# Patient Record
Sex: MALE | Race: AMERICAN INDIAN OR ALASKA NATIVE | NOT HISPANIC OR LATINO | URBAN - METROPOLITAN AREA
[De-identification: names, ages, dates, MRNs, and addresses within clinical notes are randomized per-mention and may not be internally consistent; named-entity substitution may affect disease eponyms.]

---

## 2019-08-13 ENCOUNTER — INPATIENT (INPATIENT)
Facility: HOSPITAL | Age: 54
LOS: 5 days | Discharge: HOME | End: 2019-08-19
Attending: INTERNAL MEDICINE | Admitting: INTERNAL MEDICINE
Payer: MEDICAID

## 2019-08-13 VITALS
DIASTOLIC BLOOD PRESSURE: 61 MMHG | TEMPERATURE: 98 F | RESPIRATION RATE: 16 BRPM | HEART RATE: 70 BPM | SYSTOLIC BLOOD PRESSURE: 133 MMHG | OXYGEN SATURATION: 100 %

## 2019-08-13 NOTE — ED ADULT TRIAGE NOTE - CHIEF COMPLAINT QUOTE
Patient c/o palpitations x3 days worsening today. Patient denies chest pain, c/o non-radiating abdominal pain and nausea, denies any vomiting. Denies any diarrhea. Hx. HTN, HLD, "thyroid problem"

## 2019-08-14 ENCOUNTER — TRANSCRIPTION ENCOUNTER (OUTPATIENT)
Age: 54
End: 2019-08-14

## 2019-08-14 DIAGNOSIS — I10 ESSENTIAL (PRIMARY) HYPERTENSION: ICD-10-CM

## 2019-08-14 DIAGNOSIS — R10.9 UNSPECIFIED ABDOMINAL PAIN: ICD-10-CM

## 2019-08-14 DIAGNOSIS — E78.5 HYPERLIPIDEMIA, UNSPECIFIED: ICD-10-CM

## 2019-08-14 DIAGNOSIS — E87.1 HYPO-OSMOLALITY AND HYPONATREMIA: ICD-10-CM

## 2019-08-14 DIAGNOSIS — N40.0 BENIGN PROSTATIC HYPERPLASIA WITHOUT LOWER URINARY TRACT SYMPTOMS: ICD-10-CM

## 2019-08-14 DIAGNOSIS — E87.6 HYPOKALEMIA: ICD-10-CM

## 2019-08-14 DIAGNOSIS — Z29.9 ENCOUNTER FOR PROPHYLACTIC MEASURES, UNSPECIFIED: ICD-10-CM

## 2019-08-14 DIAGNOSIS — E03.9 HYPOTHYROIDISM, UNSPECIFIED: ICD-10-CM

## 2019-08-14 DIAGNOSIS — E11.9 TYPE 2 DIABETES MELLITUS WITHOUT COMPLICATIONS: ICD-10-CM

## 2019-08-14 LAB
ALBUMIN SERPL ELPH-MCNC: 4.3 G/DL — SIGNIFICANT CHANGE UP (ref 3.3–5)
ALP SERPL-CCNC: 101 U/L — SIGNIFICANT CHANGE UP (ref 40–120)
ALT FLD-CCNC: 15 U/L — SIGNIFICANT CHANGE UP (ref 4–41)
ANION GAP SERPL CALC-SCNC: 11 MMO/L — SIGNIFICANT CHANGE UP (ref 7–14)
ANION GAP SERPL CALC-SCNC: 11 MMO/L — SIGNIFICANT CHANGE UP (ref 7–14)
ANION GAP SERPL CALC-SCNC: 12 MMO/L — SIGNIFICANT CHANGE UP (ref 7–14)
ANION GAP SERPL CALC-SCNC: 15 MMO/L — HIGH (ref 7–14)
AST SERPL-CCNC: 21 U/L — SIGNIFICANT CHANGE UP (ref 4–40)
BASE EXCESS BLDV CALC-SCNC: 5.5 MMOL/L — SIGNIFICANT CHANGE UP
BASOPHILS # BLD AUTO: 0.03 K/UL — SIGNIFICANT CHANGE UP (ref 0–0.2)
BASOPHILS NFR BLD AUTO: 0.4 % — SIGNIFICANT CHANGE UP (ref 0–2)
BILIRUB SERPL-MCNC: 0.8 MG/DL — SIGNIFICANT CHANGE UP (ref 0.2–1.2)
BLOOD GAS VENOUS - CREATININE: 1.09 MG/DL — SIGNIFICANT CHANGE UP (ref 0.5–1.3)
BLOOD GAS VENOUS - FIO2: 21 — SIGNIFICANT CHANGE UP
BUN SERPL-MCNC: 12 MG/DL — SIGNIFICANT CHANGE UP (ref 7–23)
BUN SERPL-MCNC: 8 MG/DL — SIGNIFICANT CHANGE UP (ref 7–23)
CALCIUM SERPL-MCNC: 8.8 MG/DL — SIGNIFICANT CHANGE UP (ref 8.4–10.5)
CALCIUM SERPL-MCNC: 9 MG/DL — SIGNIFICANT CHANGE UP (ref 8.4–10.5)
CALCIUM SERPL-MCNC: 9.2 MG/DL — SIGNIFICANT CHANGE UP (ref 8.4–10.5)
CALCIUM SERPL-MCNC: 9.4 MG/DL — SIGNIFICANT CHANGE UP (ref 8.4–10.5)
CHLORIDE BLDV-SCNC: 84 MMOL/L — LOW (ref 96–108)
CHLORIDE SERPL-SCNC: 81 MMOL/L — LOW (ref 98–107)
CHLORIDE SERPL-SCNC: 85 MMOL/L — LOW (ref 98–107)
CHLORIDE SERPL-SCNC: 88 MMOL/L — LOW (ref 98–107)
CHLORIDE SERPL-SCNC: 94 MMOL/L — LOW (ref 98–107)
CO2 SERPL-SCNC: 16 MMOL/L — LOW (ref 22–31)
CO2 SERPL-SCNC: 27 MMOL/L — SIGNIFICANT CHANGE UP (ref 22–31)
CREAT SERPL-MCNC: 0.92 MG/DL — SIGNIFICANT CHANGE UP (ref 0.5–1.3)
CREAT SERPL-MCNC: 1.02 MG/DL — SIGNIFICANT CHANGE UP (ref 0.5–1.3)
CREAT SERPL-MCNC: 1.02 MG/DL — SIGNIFICANT CHANGE UP (ref 0.5–1.3)
CREAT SERPL-MCNC: 1.06 MG/DL — SIGNIFICANT CHANGE UP (ref 0.5–1.3)
EOSINOPHIL # BLD AUTO: 0.24 K/UL — SIGNIFICANT CHANGE UP (ref 0–0.5)
EOSINOPHIL NFR BLD AUTO: 3.4 % — SIGNIFICANT CHANGE UP (ref 0–6)
GAS PNL BLDV: 114 MMOL/L — CRITICAL LOW (ref 136–146)
GLUCOSE BLDV-MCNC: 99 MG/DL — SIGNIFICANT CHANGE UP (ref 70–99)
GLUCOSE SERPL-MCNC: 121 MG/DL — HIGH (ref 70–99)
GLUCOSE SERPL-MCNC: 133 MG/DL — HIGH (ref 70–99)
GLUCOSE SERPL-MCNC: 93 MG/DL — SIGNIFICANT CHANGE UP (ref 70–99)
GLUCOSE SERPL-MCNC: 96 MG/DL — SIGNIFICANT CHANGE UP (ref 70–99)
HCO3 BLDV-SCNC: 27 MMOL/L — SIGNIFICANT CHANGE UP (ref 20–27)
HCT VFR BLD CALC: 36.5 % — LOW (ref 39–50)
HCT VFR BLD CALC: 37.2 % — LOW (ref 39–50)
HCT VFR BLDV CALC: 40.7 % — SIGNIFICANT CHANGE UP (ref 39–51)
HGB BLD-MCNC: 12.6 G/DL — LOW (ref 13–17)
HGB BLD-MCNC: 12.7 G/DL — LOW (ref 13–17)
HGB BLDV-MCNC: 13.2 G/DL — SIGNIFICANT CHANGE UP (ref 13–17)
IMM GRANULOCYTES NFR BLD AUTO: 0.3 % — SIGNIFICANT CHANGE UP (ref 0–1.5)
LACTATE BLDV-MCNC: 1.2 MMOL/L — SIGNIFICANT CHANGE UP (ref 0.5–2)
LYMPHOCYTES # BLD AUTO: 1.83 K/UL — SIGNIFICANT CHANGE UP (ref 1–3.3)
LYMPHOCYTES # BLD AUTO: 25.6 % — SIGNIFICANT CHANGE UP (ref 13–44)
MAGNESIUM SERPL-MCNC: 2.1 MG/DL — SIGNIFICANT CHANGE UP (ref 1.6–2.6)
MAGNESIUM SERPL-MCNC: 2.2 MG/DL — SIGNIFICANT CHANGE UP (ref 1.6–2.6)
MAGNESIUM SERPL-MCNC: 2.2 MG/DL — SIGNIFICANT CHANGE UP (ref 1.6–2.6)
MCHC RBC-ENTMCNC: 28.9 PG — SIGNIFICANT CHANGE UP (ref 27–34)
MCHC RBC-ENTMCNC: 29 PG — SIGNIFICANT CHANGE UP (ref 27–34)
MCHC RBC-ENTMCNC: 34.1 % — SIGNIFICANT CHANGE UP (ref 32–36)
MCHC RBC-ENTMCNC: 34.5 % — SIGNIFICANT CHANGE UP (ref 32–36)
MCV RBC AUTO: 83.9 FL — SIGNIFICANT CHANGE UP (ref 80–100)
MCV RBC AUTO: 84.5 FL — SIGNIFICANT CHANGE UP (ref 80–100)
MONOCYTES # BLD AUTO: 0.7 K/UL — SIGNIFICANT CHANGE UP (ref 0–0.9)
MONOCYTES NFR BLD AUTO: 9.8 % — SIGNIFICANT CHANGE UP (ref 2–14)
NEUTROPHILS # BLD AUTO: 4.32 K/UL — SIGNIFICANT CHANGE UP (ref 1.8–7.4)
NEUTROPHILS NFR BLD AUTO: 60.5 % — SIGNIFICANT CHANGE UP (ref 43–77)
NRBC # FLD: 0 K/UL — SIGNIFICANT CHANGE UP (ref 0–0)
NRBC # FLD: 0 K/UL — SIGNIFICANT CHANGE UP (ref 0–0)
OSMOLALITY SERPL: 270 MOSMO/KG — LOW (ref 275–295)
OSMOLALITY UR: 97 MOSMO/KG — SIGNIFICANT CHANGE UP (ref 50–1200)
PCO2 BLDV: 49 MMHG — SIGNIFICANT CHANGE UP (ref 41–51)
PH BLDV: 7.4 PH — SIGNIFICANT CHANGE UP (ref 7.32–7.43)
PHOSPHATE SERPL-MCNC: 2.9 MG/DL — SIGNIFICANT CHANGE UP (ref 2.5–4.5)
PHOSPHATE SERPL-MCNC: 3 MG/DL — SIGNIFICANT CHANGE UP (ref 2.5–4.5)
PHOSPHATE SERPL-MCNC: 3.1 MG/DL — SIGNIFICANT CHANGE UP (ref 2.5–4.5)
PLATELET # BLD AUTO: 269 K/UL — SIGNIFICANT CHANGE UP (ref 150–400)
PLATELET # BLD AUTO: 274 K/UL — SIGNIFICANT CHANGE UP (ref 150–400)
PMV BLD: 9.4 FL — SIGNIFICANT CHANGE UP (ref 7–13)
PMV BLD: 9.9 FL — SIGNIFICANT CHANGE UP (ref 7–13)
PO2 BLDV: 24 MMHG — LOW (ref 35–40)
POTASSIUM BLDV-SCNC: 3.1 MMOL/L — LOW (ref 3.4–4.5)
POTASSIUM SERPL-MCNC: 3.3 MMOL/L — LOW (ref 3.5–5.3)
POTASSIUM SERPL-MCNC: 3.9 MMOL/L — SIGNIFICANT CHANGE UP (ref 3.5–5.3)
POTASSIUM SERPL-MCNC: 3.9 MMOL/L — SIGNIFICANT CHANGE UP (ref 3.5–5.3)
POTASSIUM SERPL-MCNC: 5 MMOL/L — SIGNIFICANT CHANGE UP (ref 3.5–5.3)
POTASSIUM SERPL-SCNC: 3.3 MMOL/L — LOW (ref 3.5–5.3)
POTASSIUM SERPL-SCNC: 3.9 MMOL/L — SIGNIFICANT CHANGE UP (ref 3.5–5.3)
POTASSIUM SERPL-SCNC: 3.9 MMOL/L — SIGNIFICANT CHANGE UP (ref 3.5–5.3)
POTASSIUM SERPL-SCNC: 5 MMOL/L — SIGNIFICANT CHANGE UP (ref 3.5–5.3)
PROT SERPL-MCNC: 7.3 G/DL — SIGNIFICANT CHANGE UP (ref 6–8.3)
RBC # BLD: 4.35 M/UL — SIGNIFICANT CHANGE UP (ref 4.2–5.8)
RBC # BLD: 4.4 M/UL — SIGNIFICANT CHANGE UP (ref 4.2–5.8)
RBC # FLD: 12.6 % — SIGNIFICANT CHANGE UP (ref 10.3–14.5)
RBC # FLD: 12.7 % — SIGNIFICANT CHANGE UP (ref 10.3–14.5)
SAO2 % BLDV: 36.3 % — LOW (ref 60–85)
SODIUM SERPL-SCNC: 120 MMOL/L — CRITICAL LOW (ref 135–145)
SODIUM SERPL-SCNC: 123 MMOL/L — LOW (ref 135–145)
SODIUM SERPL-SCNC: 125 MMOL/L — LOW (ref 135–145)
SODIUM SERPL-SCNC: 126 MMOL/L — LOW (ref 135–145)
SODIUM UR-SCNC: 28 MMOL/L — SIGNIFICANT CHANGE UP
T3 SERPL-MCNC: 86.8 NG/DL — SIGNIFICANT CHANGE UP (ref 80–200)
T4 FREE SERPL-MCNC: 1.31 NG/DL — SIGNIFICANT CHANGE UP (ref 0.9–1.8)
TROPONIN T, HIGH SENSITIVITY: 9 NG/L — SIGNIFICANT CHANGE UP (ref ?–14)
TSH SERPL-MCNC: 5.18 UIU/ML — HIGH (ref 0.27–4.2)
WBC # BLD: 5.38 K/UL — SIGNIFICANT CHANGE UP (ref 3.8–10.5)
WBC # BLD: 7.14 K/UL — SIGNIFICANT CHANGE UP (ref 3.8–10.5)
WBC # FLD AUTO: 5.38 K/UL — SIGNIFICANT CHANGE UP (ref 3.8–10.5)
WBC # FLD AUTO: 7.14 K/UL — SIGNIFICANT CHANGE UP (ref 3.8–10.5)

## 2019-08-14 PROCEDURE — 71046 X-RAY EXAM CHEST 2 VIEWS: CPT | Mod: 26

## 2019-08-14 PROCEDURE — 74177 CT ABD & PELVIS W/CONTRAST: CPT | Mod: 26

## 2019-08-14 PROCEDURE — 99223 1ST HOSP IP/OBS HIGH 75: CPT | Mod: GC

## 2019-08-14 RX ORDER — SODIUM CHLORIDE 9 MG/ML
1000 INJECTION INTRAMUSCULAR; INTRAVENOUS; SUBCUTANEOUS
Refills: 0 | Status: DISCONTINUED | OUTPATIENT
Start: 2019-08-14 | End: 2019-08-14

## 2019-08-14 RX ORDER — AMLODIPINE BESYLATE 2.5 MG/1
5 TABLET ORAL DAILY
Refills: 0 | Status: DISCONTINUED | OUTPATIENT
Start: 2019-08-14 | End: 2019-08-15

## 2019-08-14 RX ORDER — SODIUM CHLORIDE 9 MG/ML
1000 INJECTION, SOLUTION INTRAVENOUS
Refills: 0 | Status: DISCONTINUED | OUTPATIENT
Start: 2019-08-14 | End: 2019-08-15

## 2019-08-14 RX ORDER — PANTOPRAZOLE SODIUM 20 MG/1
40 TABLET, DELAYED RELEASE ORAL
Refills: 0 | Status: DISCONTINUED | OUTPATIENT
Start: 2019-08-14 | End: 2019-08-19

## 2019-08-14 RX ORDER — POTASSIUM CHLORIDE 20 MEQ
40 PACKET (EA) ORAL ONCE
Refills: 0 | Status: COMPLETED | OUTPATIENT
Start: 2019-08-14 | End: 2019-08-14

## 2019-08-14 RX ORDER — DEXTROSE 50 % IN WATER 50 %
15 SYRINGE (ML) INTRAVENOUS ONCE
Refills: 0 | Status: DISCONTINUED | OUTPATIENT
Start: 2019-08-14 | End: 2019-08-15

## 2019-08-14 RX ORDER — INSULIN LISPRO 100/ML
VIAL (ML) SUBCUTANEOUS AT BEDTIME
Refills: 0 | Status: DISCONTINUED | OUTPATIENT
Start: 2019-08-14 | End: 2019-08-15

## 2019-08-14 RX ORDER — POTASSIUM CHLORIDE 20 MEQ
10 PACKET (EA) ORAL ONCE
Refills: 0 | Status: COMPLETED | OUTPATIENT
Start: 2019-08-14 | End: 2019-08-14

## 2019-08-14 RX ORDER — ATORVASTATIN CALCIUM 80 MG/1
20 TABLET, FILM COATED ORAL AT BEDTIME
Refills: 0 | Status: DISCONTINUED | OUTPATIENT
Start: 2019-08-14 | End: 2019-08-19

## 2019-08-14 RX ORDER — DEXTROSE 50 % IN WATER 50 %
25 SYRINGE (ML) INTRAVENOUS ONCE
Refills: 0 | Status: DISCONTINUED | OUTPATIENT
Start: 2019-08-14 | End: 2019-08-15

## 2019-08-14 RX ORDER — LOSARTAN POTASSIUM 100 MG/1
50 TABLET, FILM COATED ORAL DAILY
Refills: 0 | Status: DISCONTINUED | OUTPATIENT
Start: 2019-08-14 | End: 2019-08-15

## 2019-08-14 RX ORDER — GLUCAGON INJECTION, SOLUTION 0.5 MG/.1ML
1 INJECTION, SOLUTION SUBCUTANEOUS ONCE
Refills: 0 | Status: DISCONTINUED | OUTPATIENT
Start: 2019-08-14 | End: 2019-08-19

## 2019-08-14 RX ORDER — DEXTROSE 50 % IN WATER 50 %
12.5 SYRINGE (ML) INTRAVENOUS ONCE
Refills: 0 | Status: DISCONTINUED | OUTPATIENT
Start: 2019-08-14 | End: 2019-08-15

## 2019-08-14 RX ORDER — FAMOTIDINE 10 MG/ML
20 INJECTION INTRAVENOUS
Refills: 0 | Status: DISCONTINUED | OUTPATIENT
Start: 2019-08-14 | End: 2019-08-14

## 2019-08-14 RX ORDER — INSULIN LISPRO 100/ML
VIAL (ML) SUBCUTANEOUS
Refills: 0 | Status: DISCONTINUED | OUTPATIENT
Start: 2019-08-14 | End: 2019-08-15

## 2019-08-14 RX ORDER — SODIUM CHLORIDE 9 MG/ML
1000 INJECTION INTRAMUSCULAR; INTRAVENOUS; SUBCUTANEOUS
Refills: 0 | Status: DISCONTINUED | OUTPATIENT
Start: 2019-08-14 | End: 2019-08-19

## 2019-08-14 RX ORDER — HYDROCHLOROTHIAZIDE 25 MG
12.5 TABLET ORAL DAILY
Refills: 0 | Status: DISCONTINUED | OUTPATIENT
Start: 2019-08-14 | End: 2019-08-14

## 2019-08-14 RX ORDER — TAMSULOSIN HYDROCHLORIDE 0.4 MG/1
0.4 CAPSULE ORAL AT BEDTIME
Refills: 0 | Status: DISCONTINUED | OUTPATIENT
Start: 2019-08-14 | End: 2019-08-19

## 2019-08-14 RX ADMIN — Medication 40 MILLIEQUIVALENT(S): at 01:26

## 2019-08-14 RX ADMIN — TAMSULOSIN HYDROCHLORIDE 0.4 MILLIGRAM(S): 0.4 CAPSULE ORAL at 22:27

## 2019-08-14 RX ADMIN — AMLODIPINE BESYLATE 5 MILLIGRAM(S): 2.5 TABLET ORAL at 22:27

## 2019-08-14 RX ADMIN — Medication 100 MILLIEQUIVALENT(S): at 01:26

## 2019-08-14 RX ADMIN — LOSARTAN POTASSIUM 50 MILLIGRAM(S): 100 TABLET, FILM COATED ORAL at 12:53

## 2019-08-14 RX ADMIN — ATORVASTATIN CALCIUM 20 MILLIGRAM(S): 80 TABLET, FILM COATED ORAL at 22:26

## 2019-08-14 RX ADMIN — SODIUM CHLORIDE 75 MILLILITER(S): 9 INJECTION INTRAMUSCULAR; INTRAVENOUS; SUBCUTANEOUS at 10:53

## 2019-08-14 NOTE — ED PROVIDER NOTE - PHYSICAL EXAMINATION
Gen: NAD, non-toxic, conversational  Eyes: PERRL, EOMI   HENT: Normocephalic, atraumatic. External ears normal, no rhinorrhea, moist mucous membranes.   CV: RRR, no M/R/G  Resp: CTAB, non-labored, speaking without difficulty on room air  Abd: soft, mildly distended, non tender, non rigid, no guarding or rebound tenderness  Back: No CVAT bilaterally, no midline ttp  Skin: dry, wwp   Neuro: AOx3, speech is fluent and appropriate  Psych: Mood okay, affect euthymic

## 2019-08-14 NOTE — H&P ADULT - HISTORY OF PRESENT ILLNESS
55 yo M reporting a PMHx of HTN, HLD, T2DM (not on any medication), hypothyroidism (he is unsure the exact diagnosis for his thyroid), and chronic diffuse crampy abdominal pain 2-3 years now presenting with an acute exacerbation of his pain. The pain is diffuse and crampy, it waxes and wanes and causes early satiety as well as a loss of appetite. Patient believes he lost weight, but has not measured. Anti gas medications used to help, but no longer do. Denies N/V and hematemesis. No melena. Can have up to 3 BMs per day, no blood or mucous in the stool. No cp. Does endorse intermittent LE edema and NÚÑEZ. Former "chain smoker."    ED Course:  T 97.9, HR 70, /61, RR 16, SpO2 100%%  BMP noted to have Na 120, K 3.3, TSH 5.18, serum osm 270. CXR clear. hgb 12.6.   S/P KCl IV 10meq, KCl PO 40meq 53 yo M reporting a PMHx of HTN, HLD, T2DM (not on any medication), hypothyroidism (he is unsure the exact diagnosis for his thyroid), hypokalemia, and chronic diffuse crampy abdominal pain 2-3 years now presenting with an acute exacerbation of his pain. The pain is diffuse and crampy, it waxes and wanes and causes early satiety as well as a loss of appetite. Patient believes he lost weight, but has not measured. Anti gas medications used to help, but no longer do. Denies N/V and hematemesis. No melena. Can have up to 3 BMs per day, no blood or mucous in the stool. No cp. Does endorse intermittent LE edema and NÚÑEZ. Former "chain smoker."    ED Course:  T 97.9, HR 70, /61, RR 16, SpO2 100%%  BMP noted to have Na 120, K 3.3, TSH 5.18, serum osm 270. CXR clear. hgb 12.6.   S/P KCl IV 10meq, KCl PO 40meq

## 2019-08-14 NOTE — H&P ADULT - PROBLEM SELECTOR PLAN 2
Chronic abdominal pain previously responsive to anti gas medication and famotidine possibly 2/2 malignancy (former chain smoker) vs. PUD (has mild anemia) vs. IBS.   -GI consult  -CT A/P w/t contrast  -trial pantoprazole 40mg Qam

## 2019-08-14 NOTE — DISCHARGE NOTE NURSING/CASE MANAGEMENT/SOCIAL WORK - NSDCDPATPORTLINK_GEN_ALL_CORE
You can access the [facility] Patient Portal, offered by [facility], by registering with the following website: [email or web address]

## 2019-08-14 NOTE — ED PROVIDER NOTE - ATTENDING CONTRIBUTION TO CARE
[place]  Katie #[id number] and  Edi #[id number].  HPI: 53 yo M with Past Medical History of abdominal bloating for the past 2-3 years, started while he was still living in [place]. States that the bloating starts after he eats food, has tried antacids and anti-gas medications without significant relief, states he has never seen a gastroenterologist for this issue. Denies fevers, chills, nausea, vomiting, diarrhea, or constipation. Has had 3 bowel movements a day which is normal for him, denies any bloody or tarry stools. Has not had any new foods, has no hx of food allergies. No family members with the same problem. Denies any neuro symptoms.   EXAM: NAD, awake alert, AOX4, eyes EOMI/PERRL, heart RRR, lungs ctab, abd soft nontender, no rebound, no guarding, no CVAT.   MDM: concern for chronic issues of abd bloating. possible parasitic infection vs gastritis vs ulcer. Abd exam unremarkable. Will obtain labs and reassess. Most likely can be discharged to f/u outpt since pt now lives in [facility]. Will need GI outpt.

## 2019-08-14 NOTE — CONSULT NOTE ADULT - ASSESSMENT
53 yo M  PMHx of HTN, HLD, hypothyroidism, hx of hyponatremia and hypokalemia present with abd discomfort, found to have severe hyponatremia    Hyponatremia  urine osmo and na low likely polydipsia and decrease solid food intake  Na improved from 120 to 126 in ~ 12 hrs  on NS @ 75cc/hr continue IVF until 5pm as ordered  repeat bmp @ 6pm  Na should not improve >8meq in 24 hrs  elevated tSH  check cortisol level in AM  monitor na    hypokalemia  likely sec to poor po intake  supplemented  monitor    HTN  controlled  monitor 55 yo M  PMHx of HTN, HLD, hypothyroidism, hx of hyponatremia and hypokalemia present with abd discomfort, found to have severe hyponatremia    Hyponatremia  Acute symptomatic,   urine osmo and urine na low likely polydipsia and decrease solid food intake  Pt was drinking 3L of water daily with poor oral intake in the last one week.  Na improved from 120 to 126 in ~ 12 hrs  on NS @ 75cc/hr continue IVF until 5pm as ordered  repeat bmp @ 6pm  Na should not improve >8meq in 24 hrs  elevated tSH  check cortisol level in AM  monitor na    Hypokalemia  likely sec to poor po intake  supplemented  monitor    HTN  controlled  monitor

## 2019-08-14 NOTE — H&P ADULT - PROBLEM SELECTOR PLAN 1
Serum osm 270, likley hypovolemic in setting of poor PO intake possibly multifactorial 2/2 abdominal pain induced SIADH  -check Uosm, Lisa  -NS @ 100cc/hr  -BMP this after noon to avoid overcorrection (< 6meq/d) Serum osm 270, likley hypovolemic in setting of poor PO intake possibly multifactorial 2/2 abdominal pain induced SIADH  -check Uosm, Lisa  -NS @ 75cc/hr  -BMP this at 11 to avoid overcorrection (< 6meq/d)

## 2019-08-14 NOTE — ED PROVIDER NOTE - CARE PLAN
Principal Discharge DX:	Hyponatremia  Secondary Diagnosis:	Hypokalemia  Secondary Diagnosis:	Hypochloremia

## 2019-08-14 NOTE — H&P ADULT - NSHPSOCIALHISTORY_GEN_ALL_CORE
Lives with his wife. Former chain smoker. No EtOH. No recreational drugs. Lives with his wife. Former chain smoker. No EtOH. No recreational drugs.  Runs a candy store

## 2019-08-14 NOTE — H&P ADULT - ASSESSMENT
53 yo M reporting a PMHx of HTN, HLD, T2DM (not on any medication), hypothyroidism (he is unsure the exact diagnosis for his thyroid), and chronic abdominal pain p/w an acute exacerbation of his abdominal pain and hyponatremia. Hyponatremia likely hypovolemic 2/2 poor po intake. Abdominal pain concerning for malignancy vs. PUD.

## 2019-08-14 NOTE — CONSULT NOTE ADULT - SUBJECTIVE AND OBJECTIVE BOX
[facility]   MD JORDEN JACOBS DO ANGELA WONG, PA    TEL:  OFFICE: [phone]  DR GREER CELL: [phone]  DR. STUART CELL: [phone]  HAL FLORES CELL: [phone]      HPI:  History obtained using [employer]  ID [id number], daughter at bedside supported history   53 yo M  PMHx of HTN, HLD, hypothyroidism, hx of hyponatremia and hypokalemia has chronic abd discomfort he describe as bloatingness constant burping present with worsen symptoms past 10 days. + poor appetite, early satiety, nasuea no vomiting. no fever chills, dizziness, cough sob.   Per daughter patient was in ICU in [facility] for 4 days for hyponatremia.        Allergies:  No Known Allergies      PAST MEDICAL & SURGICAL HISTORY:  Hypokalemia  HLD (hyperlipidemia)  Diabetes  High blood pressure  No significant past surgical history      Home Medications Reviewed    Hospital Medications:   MEDICATIONS  (STANDING):  amLODIPine   Tablet 5 milliGRAM(s) Oral daily  atorvastatin 20 milliGRAM(s) Oral at bedtime  dextrose 5%. 1000 milliLiter(s) (50 mL/Hr) IV Continuous <Continuous>  dextrose 50% Injectable 12.5 Gram(s) IV Push once  dextrose 50% Injectable 25 Gram(s) IV Push once  dextrose 50% Injectable 25 Gram(s) IV Push once  insulin lispro (HumaLOG) corrective regimen sliding scale   SubCutaneous three times a day before meals  insulin lispro (HumaLOG) corrective regimen sliding scale   SubCutaneous at bedtime  losartan 50 milliGRAM(s) Oral daily  pantoprazole    Tablet 40 milliGRAM(s) Oral before breakfast  sodium chloride 0.9%. 1000 milliLiter(s) (75 mL/Hr) IV Continuous <Continuous>  tamsulosin 0.4 milliGRAM(s) Oral at bedtime      SOCIAL HISTORY:  former Smoking,     FAMILY HISTORY:  No pertinent family history in first degree relatives      REVIEW OF SYSTEMS:  CONSTITUTIONAL: No weakness, fevers or chills  EYES/ENT: No visual changes;  No vertigo or throat pain   NECK: No pain or stiffness  RESPIRATORY: No cough, wheezing, hemoptysis; No shortness of breath  CARDIOVASCULAR: No chest pain or palpitations.  GASTROINTESTINAL: see hpi  GENITOURINARY: No dysuria, frequency, foamy urine, urinary urgency, incontinence or hematuria  NEUROLOGICAL: No numbness or weakness  SKIN: No itching, burning, rashes, or lesions   VASCULAR: No bilateral lower extremity edema.   All other review of systems is negative unless indicated above.    VITALS:  T(F): 98.2 (08-14-19 @ 12:52), Max: 98.2 (08-14-19 @ 12:52)  HR: 73 (08-14-19 @ 12:52)  BP: 104/66 (08-14-19 @ 12:52)  RR: 17 (08-14-19 @ 12:52)  SpO2: 100% (08-14-19 @ 12:52)  Wt(kg): --        PHYSICAL EXAM:  Constitutional: NAD  HEENT: anicteric sclera, oropharynx clear, MMM  Neck: No JVD  Respiratory: CTAB, no wheezes, rales or rhonchi  Cardiovascular: S1, S2, RRR  Gastrointestinal: BS+, soft, NT/ND  Extremities: No cyanosis or clubbing. No peripheral edema  Neurological: A/O x 3, no focal deficits  Psychiatric: Normal mood, normal affect  : No CVA tenderness. No shell.   Skin: No rashes    LABS:  08-14    126<L>  |  88<L>  |  8   ----------------------------<  133<H>  3.9   |  27  |  1.02    Ca    9.0      14 Aug 2019 12:20  Phos  2.9     08-14  Mg     2.2     08-14    TPro  7.3  /  Alb  4.3  /  TBili  0.8  /  DBili      /  AST  21  /  ALT  15  /  AlkPhos  101  08-13    Creatinine Trend: 1.02 <--, 1.02 <--, 1.06 <--                        12.7   5.38  )-----------( 269      ( 14 Aug 2019 07:14 )             37.2     Urine Studies:    Osmolality, Random Urine: 97 mosmo/kg (08-14 @ 05:55)  Sodium, Random Urine: 28 mmol/L (08-14 @ 05:55)      RADIOLOGY & ADDITIONAL STUDIES: [facility]   MD JORDEN JACOBS DO ANGELA WONG, PA    TEL:  OFFICE: [phone]  DR GREER CELL: [phone]  DR. STUART CELL: [phone]  HAL FLORES CELL: [phone]      HPI:  History obtained using [employer]  ID [id number], daughter at bedside supported history   53 yo M  PMHx of HTN, HLD, hypothyroidism, hx of hyponatremia and hypokalemia has chronic abd discomfort he describe as bloatingness constant burping present with worsen symptoms past 10 days. + poor appetite, early satiety, nasuea no vomiting. no fever chills, dizziness, cough sob. Pt states he has been dizzy for 6 months. He just came from [facility] in February. In the last 10days he couldnt sleep, eat and felt unwell with worsening dizziness. Pt admits to drinking 3L of water daily.   Per daughter patient was in ICU in [facility] for 4 days for hyponatremia.        Allergies:  No Known Allergies      PAST MEDICAL & SURGICAL HISTORY:  Hypokalemia  HLD (hyperlipidemia)  Diabetes  High blood pressure  No significant past surgical history      Home Medications Reviewed    Hospital Medications:   MEDICATIONS  (STANDING):  amLODIPine   Tablet 5 milliGRAM(s) Oral daily  atorvastatin 20 milliGRAM(s) Oral at bedtime  dextrose 5%. 1000 milliLiter(s) (50 mL/Hr) IV Continuous <Continuous>  dextrose 50% Injectable 12.5 Gram(s) IV Push once  dextrose 50% Injectable 25 Gram(s) IV Push once  dextrose 50% Injectable 25 Gram(s) IV Push once  insulin lispro (HumaLOG) corrective regimen sliding scale   SubCutaneous three times a day before meals  insulin lispro (HumaLOG) corrective regimen sliding scale   SubCutaneous at bedtime  losartan 50 milliGRAM(s) Oral daily  pantoprazole    Tablet 40 milliGRAM(s) Oral before breakfast  sodium chloride 0.9%. 1000 milliLiter(s) (75 mL/Hr) IV Continuous <Continuous>  tamsulosin 0.4 milliGRAM(s) Oral at bedtime      SOCIAL HISTORY:  former Smoking,     FAMILY HISTORY:  No pertinent family history in first degree relatives      REVIEW OF SYSTEMS:  CONSTITUTIONAL: No weakness, fevers or chills  EYES/ENT: No visual changes;  No vertigo or throat pain   NECK: No pain or stiffness  RESPIRATORY: No cough, wheezing, hemoptysis; No shortness of breath  CARDIOVASCULAR: No chest pain or palpitations.  GASTROINTESTINAL: see hpi  GENITOURINARY: No dysuria, frequency, foamy urine, urinary urgency, incontinence or hematuria  NEUROLOGICAL: No numbness or weakness  SKIN: No itching, burning, rashes, or lesions   VASCULAR: No bilateral lower extremity edema.   All other review of systems is negative unless indicated above.    VITALS:  T(F): 98.2 (08-14-19 @ 12:52), Max: 98.2 (08-14-19 @ 12:52)  HR: 73 (08-14-19 @ 12:52)  BP: 104/66 (08-14-19 @ 12:52)  RR: 17 (08-14-19 @ 12:52)  SpO2: 100% (08-14-19 @ 12:52)  Wt(kg): --        PHYSICAL EXAM:  Constitutional: NAD  HEENT: anicteric sclera, oropharynx clear, MMM  Neck: No JVD  Respiratory: CTAB, no wheezes, rales or rhonchi  Cardiovascular: S1, S2, RRR  Gastrointestinal: BS+, soft, NT/ND  Extremities: No cyanosis or clubbing. No peripheral edema  Neurological: A/O x 3, no focal deficits  Psychiatric: Normal mood, normal affect  : No CVA tenderness. No shell.   Skin: No rashes    LABS:  08-14    126<L>  |  88<L>  |  8   ----------------------------<  133<H>  3.9   |  27  |  1.02    Ca    9.0      14 Aug 2019 12:20  Phos  2.9     08-14  Mg     2.2     08-14    TPro  7.3  /  Alb  4.3  /  TBili  0.8  /  DBili      /  AST  21  /  ALT  15  /  AlkPhos  101  08-13    Creatinine Trend: 1.02 <--, 1.02 <--, 1.06 <--                        12.7   5.38  )-----------( 269      ( 14 Aug 2019 07:14 )             37.2     Urine Studies:    Osmolality, Random Urine: 97 mosmo/kg (08-14 @ 05:55)  Sodium, Random Urine: 28 mmol/L (08-14 @ 05:55)      RADIOLOGY & ADDITIONAL STUDIES:

## 2019-08-14 NOTE — ED PROVIDER NOTE - CLINICAL SUMMARY MEDICAL DECISION MAKING FREE TEXT BOX
54M presenting for evaluation of abdominal pain, states has been present for years, comes on after eating, diffuse abdominal distention, has been told by prior eval to use anti-gas / anti-acids but has not had full relief, only partial, still passing BMs. On exam mildly distended abdomen, still has bowel sounds, will send labs, treat with pepcid / maalox, has follow up with gastroenterology outpatient for evaluation with EGD.

## 2019-08-14 NOTE — DISCHARGE NOTE NURSING/CASE MANAGEMENT/SOCIAL WORK - NSDCPEWEB_GEN_ALL_CORE
[facility] for Tobacco Control website --- [email or web address] --- [email or web address]

## 2019-08-14 NOTE — ED ADULT NURSE NOTE - NSIMPLEMENTINTERV_GEN_ALL_ED
Implemented All Universal Safety Interventions:  [place] to call system. Call bell, personal items and telephone within reach. Instruct patient to call for assistance. Room bathroom lighting operational. Non-slip footwear when patient is off stretcher. Physically safe environment: no spills, clutter or unnecessary equipment. Stretcher in lowest position, wheels locked, appropriate side rails in place.

## 2019-08-14 NOTE — H&P ADULT - PROBLEM SELECTOR PLAN 4
Resistant hypertension with hypokalemia  -HCTZ 12.5mg qd (holding 2/2 hypovolemia)  -Losartan 50mg qd  -Amlodipine 5mg qd

## 2019-08-14 NOTE — H&P ADULT - ATTENDING COMMENTS
Personally saw and examined patient.  Discussed plan with resident.  Highlights below.    Patient with 2-3 years of abdominal pain characterizing mainly as a diffuse crampy pain with PO intake and significant bloating.  Has associated early satiety.  Reports had an E[place] but was not told results per patient.  Uncertain how much weight loss.  Over the past year, has had decreased PO intake, feels more lightheaded, also thirsty.  Has continued to take HCTZ though.      ROS/PMH/PSH/FHx/SocHx as per resident note.      Exam: appears dry with dry mucous membranes, dry skin, chapped lips, no edema, slightly increased skin turgor.  Otherwise only slight abdominal pain with deep palpation.    Diagnostics: [place] and Uosm most consistent with a hypovolemic hyponatremia as consistent with exam.  [place] 28 even though on HCTZ.  UOsm 98 showing patient appropriately diluting urine not consistent with SIADH.  Sodium, Chloride, Potassium also all low suggestive of hypovolemic state.  EKG personally reviewed and NSR w/ possible q wave V2.  CXR clear.    Plan:   #Hyponatremia  -agree that this is most likely a chronic hypovolemic hyponatremia given urine studies and exam  -Gentle fluids and frequent BMP checks (next at 11 am) to avoid overcorrection  -Hold HCTZ and recommend against resuming    #Abdominal pain  -Uncertain etiology.  Lower concern for malignancy, ulcer but is having red flag symptoms of dehydration and weight loss.  -CT a/p  -Consider GI consult and possible EGD once sodium is corrected to acceptable state - could be deferred to outpatient side.  -Can trial pantoprazole    #Home meds for chronic problems

## 2019-08-14 NOTE — ED PROVIDER NOTE - OBJECTIVE STATEMENT
Hx of abdominal bloating for the past 2-3 years. Hx of abdominal bloating for the past 2-3 years, started while he was still living in [facility]. States that the bloating starts after he eats food, has tried antacids and anti-gas medications without significant relief, states he has never seen a gastroenterologist for this issue. Denies fevers, chills, nausea, vomiting, diarrhea, or constipation. Has had 3 bowel movements a day which is normal for him, denies any bloody or tarry stools. Has not had any new foods, has no hx of food allergies. No family members with the same problem.     Hx above obtained via [facility]  Katie #[id number] and  Edi #[id number].

## 2019-08-14 NOTE — H&P ADULT - NSHPPHYSICALEXAM_GEN_ALL_CORE
PHYSICAL EXAM:  GENERAL: NAD, well-developed  HEAD:  Atraumatic, Normocephalic  EYES: EOMI, PERRLA, conjunctiva and sclera clear  NECK: Supple, No JVD  CHEST/LUNG: Clear to auscultation bilaterally; No wheeze  HEART: NL s1s2, regular rate and rhythm; No murmurs, rubs, or gallops  ABDOMEN: Soft, Nontender, Nondistended; Bowel sounds present  EXTREMITIES:  2+ Peripheral Pulses, No clubbing, cyanosis, or edema  PSYCH: AAOx3  NEUROLOGY: non-focal  SKIN: dry skin of the bilateral feet and ankles PHYSICAL EXAM:  GENERAL: NAD, well-developed  HEAD:  Atraumatic, Normocephalic, dry lips and oral mucosa  EYES: EOMI, PERRLA, conjunctiva and sclera clear  NECK: Supple, No JVD  CHEST/LUNG: Clear to auscultation bilaterally; No wheeze  HEART: NL s1s2, regular rate and rhythm; No murmurs, rubs, or gallops  ABDOMEN: Soft, Nontender, Nondistended; Bowel sounds present  EXTREMITIES:  2+ Peripheral Pulses, No clubbing, cyanosis, or edema  PSYCH: AAOx3  NEUROLOGY: non-focal  SKIN: dry skin of the bilateral feet and ankles

## 2019-08-14 NOTE — ED PROVIDER NOTE - PROGRESS NOTE DETAILS
BARBA: Pt Sodium, K and Cl all low on CMP. Possible due to meds. Pt reports tolerating PO and eating normally. Will most likely need admission. Glu normal. Calculations shows can replace sodium with NS at 695 ml/Hr but unsure if this condition acute vs chronic but most likely chronic given no MS changes or neuro abnormalities.

## 2019-08-14 NOTE — H&P ADULT - NSHPLABSRESULTS_GEN_ALL_CORE
12.6   7.14  )-----------( 274      ( 13 Aug 2019 23:52 )             36.5       08-13    120<LL>  |  81<L>  |  8   ----------------------------<  96  3.3<L>   |  27  |  1.06    Ca    9.4      13 Aug 2019 23:52  Phos  3.1     08-13  Mg     2.2     08-13    TPro  7.3  /  Alb  4.3  /  TBili  0.8  /  DBili  x   /  AST  21  /  ALT  15  /  AlkPhos  101  08-13        Lactate Trend    CAPILLARY BLOOD GLUCOSE      Thyroid Stimulating Hormone, Serum (08.13.19 @ 23:52)    Thyroid Stimulating Hormone, Serum: 5.18 uIU/mL    < from: Xray Chest 2 Views PA/Lat (08.14.19 @ 02:19) >    IMPRESSION:   No focal consolidations.    < end of copied text > 12.6   7.14  )-----------( 274      ( 13 Aug 2019 23:52 )             36.5       08-13    120<LL>  |  81<L>  |  8   ----------------------------<  96  3.3<L>   |  27  |  1.06    Ca    9.4      13 Aug 2019 23:52  Phos  3.1     08-13  Mg     2.2     08-13    TPro  7.3  /  Alb  4.3  /  TBili  0.8  /  DBili  x   /  AST  21  /  ALT  15  /  AlkPhos  101  08-13        Lactate Trend    CAPILLARY BLOOD GLUCOSE      Thyroid Stimulating Hormone, Serum (08.13.19 @ 23:52)    Thyroid Stimulating Hormone, Serum: 5.18 uIU/mL    < from: Xray Chest 2 Views PA/Lat (08.14.19 @ 02:19) >    IMPRESSION:   No focal consolidations.    < end of copied text >    CXR personally reviewed and clear  EKG personally reviewed and NSR, q wave in V2, no acute ischemic changes.

## 2019-08-14 NOTE — ED ADULT NURSE NOTE - OBJECTIVE STATEMENT
break coverage RN-  phone used see provider note documentation - pt c/o abdominal distention, feeling of gas in stomach, worse after eating- seen by PCP and told to treat with anti gas mediation- denies any constipation, n/v/d, fevers/chills, urinary symptom. pt appears in NAD with family at bedsidem blood work sent, vitals as noted, MD at bedside, awaiting further orders from MD, will continue to monitor, pt safety maintained.

## 2019-08-14 NOTE — DISCHARGE NOTE NURSING/CASE MANAGEMENT/SOCIAL WORK - NSDCPEEMAIL_GEN_ALL_CORE
[facility] for Tobacco Control email [email or web address]

## 2019-08-14 NOTE — H&P ADULT - PROBLEM SELECTOR PLAN 3
Unclear eitiology at current. Denies vomiting. Has h/o HTN resistant on 3 medications concerning for hyperaldosteronism, but may also be 2/2 untreated hypothyroidism vs. HCTZ  -replete PRN  -check mg & phos  -f/u CT A/P for adrenal tumor  -f/u thyroid w/u

## 2019-08-14 NOTE — H&P ADULT - NSHPREVIEWOFSYSTEMS_GEN_ALL_CORE
REVIEW OF SYSTEMS:    CONSTITUTIONAL: No weakness, fevers or chills  EYES/ENT: No visual changes;  No vertigo or throat pain   NECK: No pain or stiffness  RESPIRATORY: No cough, wheezing, hemoptysis; No shortness of breath  CARDIOVASCULAR: No chest pain or palpitations. + LE edema  GASTROINTESTINAL: As noted above.  GENITOURINARY: No dysuria, frequency or hematuria  NEUROLOGICAL: No numbness or weakness  SKIN: No itching, burning, rashes, or lesions   All other review of systems is negative unless indicated above. REVIEW OF SYSTEMS:    CONSTITUTIONAL: No weakness, fevers or chills  EYES/ENT: No visual changes;  No vertigo or throat pain   NECK: No pain or stiffness  RESPIRATORY: No cough, wheezing, hemoptysis; No shortness of breath  CARDIOVASCULAR: No chest pain or palpitations. + LE edema  GASTROINTESTINAL: As noted above.  GENITOURINARY: No dysuria, frequency or hematuria  NEUROLOGICAL: No numbness or weakness  SKIN: No itching, burning, rashes, or lesions   ENDO: no temperature intolerance  All other review of systems is negative unless indicated above.

## 2019-08-14 NOTE — H&P ADULT - PROBLEM SELECTOR PLAN 5
TSH 5.18 in setting of hyponatremia, hypokalemia, and peripheral edema concerning for hypothyroidism  -free T4  -total T3

## 2019-08-15 DIAGNOSIS — R73.03 PREDIABETES: ICD-10-CM

## 2019-08-15 DIAGNOSIS — E03.9 HYPOTHYROIDISM, UNSPECIFIED: ICD-10-CM

## 2019-08-15 DIAGNOSIS — E27.40 UNSPECIFIED ADRENOCORTICAL INSUFFICIENCY: ICD-10-CM

## 2019-08-15 LAB
ANION GAP SERPL CALC-SCNC: 13 MMO/L — SIGNIFICANT CHANGE UP (ref 7–14)
BUN SERPL-MCNC: 13 MG/DL — SIGNIFICANT CHANGE UP (ref 7–23)
CALCIUM SERPL-MCNC: 9.1 MG/DL — SIGNIFICANT CHANGE UP (ref 8.4–10.5)
CHLORIDE SERPL-SCNC: 94 MMOL/L — LOW (ref 98–107)
CO2 SERPL-SCNC: 21 MMOL/L — LOW (ref 22–31)
CORTIS SERPL-MCNC: 7.1 UG/DL — SIGNIFICANT CHANGE UP (ref 2.7–18.4)
CREAT SERPL-MCNC: 1.06 MG/DL — SIGNIFICANT CHANGE UP (ref 0.5–1.3)
GLUCOSE SERPL-MCNC: 96 MG/DL — SIGNIFICANT CHANGE UP (ref 70–99)
HBA1C BLD-MCNC: 5.8 % — HIGH (ref 4–5.6)
HCT VFR BLD CALC: 38.2 % — LOW (ref 39–50)
HCV AB S/CO SERPL IA: 0.07 S/CO — SIGNIFICANT CHANGE UP (ref 0–0.99)
HCV AB SERPL-IMP: SIGNIFICANT CHANGE UP
HGB BLD-MCNC: 12.4 G/DL — LOW (ref 13–17)
MAGNESIUM SERPL-MCNC: 2.1 MG/DL — SIGNIFICANT CHANGE UP (ref 1.6–2.6)
MCHC RBC-ENTMCNC: 28.6 PG — SIGNIFICANT CHANGE UP (ref 27–34)
MCHC RBC-ENTMCNC: 32.5 % — SIGNIFICANT CHANGE UP (ref 32–36)
MCV RBC AUTO: 88.2 FL — SIGNIFICANT CHANGE UP (ref 80–100)
NRBC # FLD: 0 K/UL — SIGNIFICANT CHANGE UP (ref 0–0)
PHOSPHATE SERPL-MCNC: 4 MG/DL — SIGNIFICANT CHANGE UP (ref 2.5–4.5)
PLATELET # BLD AUTO: 279 K/UL — SIGNIFICANT CHANGE UP (ref 150–400)
PMV BLD: 10.1 FL — SIGNIFICANT CHANGE UP (ref 7–13)
POTASSIUM SERPL-MCNC: 4.4 MMOL/L — SIGNIFICANT CHANGE UP (ref 3.5–5.3)
POTASSIUM SERPL-SCNC: 4.4 MMOL/L — SIGNIFICANT CHANGE UP (ref 3.5–5.3)
RBC # BLD: 4.33 M/UL — SIGNIFICANT CHANGE UP (ref 4.2–5.8)
RBC # FLD: 12.9 % — SIGNIFICANT CHANGE UP (ref 10.3–14.5)
SODIUM SERPL-SCNC: 128 MMOL/L — LOW (ref 135–145)
T3 SERPL-MCNC: 93.4 NG/DL — SIGNIFICANT CHANGE UP (ref 80–200)
T4 FREE SERPL-MCNC: 1.17 NG/DL — SIGNIFICANT CHANGE UP (ref 0.9–1.8)
TSH SERPL-MCNC: 3.06 UIU/ML — SIGNIFICANT CHANGE UP (ref 0.27–4.2)
WBC # BLD: 7.26 K/UL — SIGNIFICANT CHANGE UP (ref 3.8–10.5)
WBC # FLD AUTO: 7.26 K/UL — SIGNIFICANT CHANGE UP (ref 3.8–10.5)

## 2019-08-15 PROCEDURE — 99255 IP/OBS CONSLTJ NEW/EST HI 80: CPT | Mod: GC

## 2019-08-15 RX ORDER — SODIUM CHLORIDE 9 MG/ML
500 INJECTION INTRAMUSCULAR; INTRAVENOUS; SUBCUTANEOUS ONCE
Refills: 0 | Status: COMPLETED | OUTPATIENT
Start: 2019-08-15 | End: 2019-08-15

## 2019-08-15 RX ORDER — SODIUM CHLORIDE 9 MG/ML
3 INJECTION INTRAMUSCULAR; INTRAVENOUS; SUBCUTANEOUS EVERY 8 HOURS
Refills: 0 | Status: DISCONTINUED | OUTPATIENT
Start: 2019-08-15 | End: 2019-08-19

## 2019-08-15 RX ADMIN — SODIUM CHLORIDE 3 MILLILITER(S): 9 INJECTION INTRAMUSCULAR; INTRAVENOUS; SUBCUTANEOUS at 05:46

## 2019-08-15 RX ADMIN — SODIUM CHLORIDE 500 MILLILITER(S): 9 INJECTION INTRAMUSCULAR; INTRAVENOUS; SUBCUTANEOUS at 06:05

## 2019-08-15 RX ADMIN — ATORVASTATIN CALCIUM 20 MILLIGRAM(S): 80 TABLET, FILM COATED ORAL at 21:59

## 2019-08-15 RX ADMIN — PANTOPRAZOLE SODIUM 40 MILLIGRAM(S): 20 TABLET, DELAYED RELEASE ORAL at 05:49

## 2019-08-15 RX ADMIN — SODIUM CHLORIDE 3 MILLILITER(S): 9 INJECTION INTRAMUSCULAR; INTRAVENOUS; SUBCUTANEOUS at 13:37

## 2019-08-15 RX ADMIN — TAMSULOSIN HYDROCHLORIDE 0.4 MILLIGRAM(S): 0.4 CAPSULE ORAL at 21:59

## 2019-08-15 RX ADMIN — SODIUM CHLORIDE 3 MILLILITER(S): 9 INJECTION INTRAMUSCULAR; INTRAVENOUS; SUBCUTANEOUS at 21:54

## 2019-08-15 NOTE — PROGRESS NOTE ADULT - SUBJECTIVE AND OBJECTIVE BOX
Patient is a 54y old  Male who presents with a chief complaint of CC: Abdominal pain and hyponatremia (15 Aug 2019 14:56)                                                               INTERVAL HPI/OVERNIGHT EVENTS:    REVIEW OF SYSTEMS:     CONSTITUTIONAL: No weakness, fevers or chills  EYES/ENT: No visual changes , no ear ache   NECK: No pain or stiffness  RESPIRATORY: No cough, wheezing,  No shortness of breath  CARDIOVASCULAR: No chest pain or palpitations  GASTROINTESTINAL:  abdominal pain  . No nausea, vomiting, or hematemesis; No diarrhea or constipation. No melena or hematochezia.  GENITOURINARY: No dysuria, frequency or hematuria  NEUROLOGICAL: No numbness or weakness                                                                                                                                                                                                                                                                                  Medications:  MEDICATIONS  (STANDING):  atorvastatin 20 milliGRAM(s) Oral at bedtime  pantoprazole    Tablet 40 milliGRAM(s) Oral before breakfast  sodium chloride 0.9% lock flush 3 milliLiter(s) IV Push every 8 hours  sodium chloride 0.9%. 1000 milliLiter(s) (75 mL/Hr) IV Continuous <Continuous>  tamsulosin 0.4 milliGRAM(s) Oral at bedtime    MEDICATIONS  (PRN):  glucagon  Injectable 1 milliGRAM(s) IntraMuscular once PRN Glucose LESS THAN 70 milligrams/deciliter       Allergies    No Known Allergies    Intolerances      Vital Signs Last 24 Hrs  T(C): 36.7 (15 Aug 2019 05:43), Max: 36.7 (15 Aug 2019 05:43)  T(F): 98.1 (15 Aug 2019 05:43), Max: 98.1 (15 Aug 2019 05:43)  HR: 80 (15 Aug 2019 13:36) (67 - 80)  BP: 139/65 (15 Aug 2019 13:36) (94/56 - 139/65)  BP(mean): 83 (15 Aug 2019 13:36) (69 - 83)  RR: 18 (15 Aug 2019 13:36) (17 - 18)  SpO2: 99% (15 Aug 2019 13:36) (98% - 99%)  CAPILLARY BLOOD GLUCOSE      POCT Blood Glucose.: 98 mg/dL (15 Aug 2019 17:39)  POCT Blood Glucose.: 102 mg/dL (15 Aug 2019 13:13)  POCT Blood Glucose.: 105 mg/dL (15 Aug 2019 08:47)  POCT Blood Glucose.: 106 mg/dL (14 Aug 2019 21:07)      08-14 @ 07:01  -  08-15 @ 07:00  --------------------------------------------------------  IN: 710 mL / OUT: 0 mL / NET: 710 mL      Physical Exam:    Daily Height in cm: 162.56 (14 Aug 2019 19:02)    Daily   General:  NAD   HEENT:  Nonicteric, PERRLA  CV:  RRR, S1S2   Lungs:  CTA   Abdomen:  Soft, mild tenderness   Extremities:  2+ pulses, no c/c, no edema  Skin:  Warm and dry, no rashes  :  No shell  Neuro:  AAOx3, non-focal, grossly intact                                                                                                                                                                                                                                                                                                LABS:                               12.4   7.26  )-----------( 279      ( 15 Aug 2019 05:19 )             38.2                      08-15    128<L>  |  94<L>  |  13  ----------------------------<  96  4.4   |  21<L>  |  1.06    Ca    9.1      15 Aug 2019 05:19  Phos  4.0     08-15  Mg     2.1     08-15    TPro  7.3  /  Alb  4.3  /  TBili  0.8  /  DBili  x   /  AST  21  /  ALT  15  /  AlkPhos  101  08-13

## 2019-08-15 NOTE — PROGRESS NOTE ADULT - ASSESSMENT
53 yo M  PMHx of HTN, HLD, hypothyroidism, hx of hyponatremia and hypokalemia present with abd discomfort, found to have severe hyponatremia    Hyponatremia  Acute symptomatic   urine osmo and urine na low likely polydipsia and decrease solid food intake, on HCTZ  Pt was drinking 3L of water daily with poor oral intake in the last one week.  Na improving  HCTZ on hold (would avoid in the future)  received 500cc bolus this AM for hypotenison  free water restriction <1L/day, patient educated using  ID #[id number]  monitor bmp  elevated tSH  Cortisol level 7.1, low in view of hospitalization consider Endo eval for adrenal insuffiencey   monitor na    Hypokalemia  likely sec to poor po intake  supplemented  monitor    HTN  low bp s/p bolus   will hold bp meds (losartan  50, norvasc 5)  endo eval for r/o adrenal insuffiencey   monitor 55 yo M  PMHx of HTN, HLD, hypothyroidism, hx of hyponatremia and hypokalemia present with abd discomfort, found to have severe hyponatremia    Hyponatremia  Acute symptomatic   urine osmo and urine na low likely polydipsia and decrease solid food intake, on HCTZ  Pt was drinking 3L+ of water daily with poor oral intake in the last one week.  Na improving  HCTZ on hold (would avoid in the future)  received 500cc bolus this AM for hypotenison  free water restriction <1L/day, patient educated using  ID #[id number]  monitor bmp  elevated tSH  Cortisol level 7.1, low in view of hospitalization consider Endo eval for adrenal insuffiencey   monitor na    Hypokalemia  likely sec to poor po intake  supplemented  monitor    HTN  low bp s/p bolus   will hold bp meds (losartan  50, norvasc 5)  endo eval for r/o adrenal insuffiencey   monitor

## 2019-08-15 NOTE — CONSULT NOTE ADULT - PROBLEM SELECTOR RECOMMENDATION 2
subclinical hypothyroidism with TSH 5.18, FT4 1.3, TT3 86. No sxs of hypothyroidism  - outpatient f/u with TSH, FT4 monitoring subclinical hypothyroidism with TSH 5.18, FT4 1.3, TT3 86. No sxs of hypothyroidism  - repeat TSH, FT4 in AM with TPO and thyroglobulin ab   - if antibody positive then would be more inclined to treat with low dose levothyroxine.

## 2019-08-15 NOTE — PROGRESS NOTE ADULT - SUBJECTIVE AND OBJECTIVE BOX
[facility] [place] [facility]   MD JORDEN JACOBS DO ANGELA WONG, PA    TEL:  OFFICE: [phone]  DR GREER CELL: [phone]  DR. STUART CELL: [phone]  HAL FLORES CELL: [phone]        Patient is a 54y old  Male who presents with a chief complaint of CC: Abdominal pain and hyponatremia (14 Aug 2019 14:42)      Patient seen and examined at bedside. No chest pain/sob    VITALS:  T(F): 98.1 (08-15-19 @ 05:43), Max: 98.2 (08-14-19 @ 12:52)  HR: 70 (08-15-19 @ 08:11)  BP: 100/59 (08-15-19 @ 08:11)  RR: 17 (08-15-19 @ 05:43)  SpO2: 98% (08-15-19 @ 05:43)  Wt(kg): --    08-14 @ 07:01  -  08-15 @ 07:00  --------------------------------------------------------  IN: 710 mL / OUT: 0 mL / NET: 710 mL      Height (cm): 162.6 (08-14 @ 19:02)  Weight (kg): 66.3 (08-14 @ 19:02)  BMI (kg/m2): 25.1 (08-14 @ 19:02)  BSA (m2): 1.71 (08-14 @ 19:02)    PHYSICAL EXAM:  Constitutional: NAD  Neck: No JVD  Respiratory: CTAB, no wheezes, rales or rhonchi  Cardiovascular: S1, S2, RRR  Gastrointestinal: BS+, soft, NT/ND  Extremities: No peripheral edema    Hospital Medications:   MEDICATIONS  (STANDING):  amLODIPine   Tablet 5 milliGRAM(s) Oral daily  atorvastatin 20 milliGRAM(s) Oral at bedtime  dextrose 5%. 1000 milliLiter(s) (50 mL/Hr) IV Continuous <Continuous>  dextrose 50% Injectable 12.5 Gram(s) IV Push once  dextrose 50% Injectable 25 Gram(s) IV Push once  dextrose 50% Injectable 25 Gram(s) IV Push once  insulin lispro (HumaLOG) corrective regimen sliding scale   SubCutaneous three times a day before meals  insulin lispro (HumaLOG) corrective regimen sliding scale   SubCutaneous at bedtime  losartan 50 milliGRAM(s) Oral daily  pantoprazole    Tablet 40 milliGRAM(s) Oral before breakfast  sodium chloride 0.9% lock flush 3 milliLiter(s) IV Push every 8 hours  sodium chloride 0.9%. 1000 milliLiter(s) (75 mL/Hr) IV Continuous <Continuous>  tamsulosin 0.4 milliGRAM(s) Oral at bedtime      LABS:  08-15    128<L>  |  94<L>  |  13  ----------------------------<  96  4.4   |  21<L>  |  1.06    Ca    9.1      15 Aug 2019 05:19  Phos  4.0     08-15  Mg     2.1     08-15    TPro  7.3  /  Alb  4.3  /  TBili  0.8  /  DBili      /  AST  21  /  ALT  15  /  AlkPhos  101  08-13    Creatinine Trend: 1.06 <--, 0.92 <--, 1.02 <--, 1.02 <--, 1.06 <--    Phosphorus Level, Serum: 4.0 mg/dL (08-15 @ 05:19)  Phosphorus Level, Serum: 2.9 mg/dL (08-14 @ 12:20)                              12.4   7.26  )-----------( 279      ( 15 Aug 2019 05:19 )             38.2     Urine Studies:    Urine Sodium 28      [08-14-19 @ 05:55]  Urine Osmolality 97      [08-14-19 @ 05:55]    HbA1c 5.8      [08-15-19 @ 05:19]  TSH 3.06      [08-15-19 @ 05:19]        RADIOLOGY & ADDITIONAL STUDIES:

## 2019-08-15 NOTE — CONSULT NOTE ADULT - PROBLEM SELECTOR RECOMMENDATION 3
hgb a1c 5.8 - prediabetes not on any medications  -yearly hgb a1c check outpatient  -[facility] TIDAC hgb a1c 5.8   - prediabetes not on any medications  - yearly hgb a1c check outpatient  - please d/c FS and ISS

## 2019-08-15 NOTE — CONSULT NOTE ADULT - ATTENDING COMMENTS
54M with hyponatremia, ruling out endocrine components. Please check 8AM cortisol and will decide if stim test is needed.  Recheck TSH and check TPO antibody.

## 2019-08-15 NOTE — CONSULT NOTE ADULT - ASSESSMENT
53 yo M reporting a PMHx of HTN, HLD, prediabetes, and subclinical hypothyroidism with abd bloating admitted for hyponatremia likely 2/2 polydipsia.

## 2019-08-15 NOTE — PROGRESS NOTE ADULT - ASSESSMENT
53 yo M reporting a PMHx of HTN, HLD, T2DM (not on any medication), hypothyroidism (he is unsure the exact diagnosis for his thyroid), and chronic abdominal pain p/w an acute exacerbation of his abdominal pain and hyponatremia. Hyponatremia likely hypovolemic 2/2 poor po intake. Abdominal pain concerning for malignancy vs. PUD.      Problem/Plan - 1:  ·  Problem: Hyponatremia.  Plan:  improving    f/u with renal     Problem/Plan - 2:  ·  Problem: Abdominal pain.  Plan: Chronic abdominal pain previously responsive to anti gas medication and famotidine   -CT A/P  :  no acute pathology   -trial pantoprazole 40mg Qam.   consider scoping as i.p    Problem/Plan - 3:  ·  Problem: Hypokalemia.  Plan:  replete and monitor     Problem/Plan - 4:  ·  Problem: Hypertension.  Plan:   -HCTZ 12.5mg qd (holding 2/2 hypovolemia)  holding ARB and norvasc for now : BP on lower NL    Problem/Plan - 5:  ·  Problem: Hypothyroidism.  Plan:  cendo consulted     Problem/Plan - 6:  Problem: Diabetes. Plan: Reports h/o T2DM, but takes no medications  -A1c  5.8      Problem/Plan - 7:  ·  Problem: HLD (hyperlipidemia).  Plan: -atorvastatin 20mg.     Problem/Plan - 8:  ·  Problem: BPH (benign prostatic hyperplasia).  Plan: tamsulosin 0.4mg qhs.     Problem/Plan - 9:  ·  Problem: low cortisol :   endo input

## 2019-08-15 NOTE — CONSULT NOTE ADULT - SUBJECTIVE AND OBJECTIVE BOX
HPI:  55 yo M reporting a PMHx of HTN, HLD, hypothyroidism (he is unsure the exact diagnosis for his thyroid), hypokalemia, and chronic diffuse crampy abdominal pain 2-3 years now presenting with an acute exacerbation of his pain. The pain is diffuse and crampy, it waxes and wanes and causes early satiety as well as a loss of appetite. Patient believes he lost weight, but has not measured. Anti gas medications used to help, but no longer do. Denies N/V and hematemesis. No melena. Can have up to 3 BMs per day, no blood or mucous in the stool. No cp. Does endorse intermittent LE edema and NÚÑEZ. Former "chain smoker."    ED Course:  T 97.9, HR 70, /61, RR 16, SpO2 100%%  BMP noted to have Na 120, K 3.3, TSH 5.18, serum osm 270. CXR clear. hgb 12.6.   S/P KCl IV 10meq, KCl PO 40meq (14 Aug 2019 05:58)      Endo:  Patient was admitted for hyponatremia in setting of drinking 3 L a day for his abd bloating.    Denies hx of steroid use, steroid deficiency, adrenal or pituitary disease. However review of medications at bedside reveals prednisiolone 1% eye drop that the patient has been using since June since his eye surgery. Denies fevers or any current abd discomfort, fatigue, nausea/vomiting, confusion.     ?hx of hypothyroidism not on any medications now and said he was taken off "thyroid medication" 1 year ago because his doctor said he does not need it anymore. He took it 2-3 times a day and it taste like lemon. His thyroid labs are suggestive of subclinical hypothyroidism.   Denies FH of thyroid disorders. Endorses weight loss of 1-2 kg in the last 5 months, cold intolerance, constipation, dry skin, hair loss.     Denies hx of diabetes.     PAST MEDICAL & SURGICAL HISTORY:  Hypokalemia  HLD (hyperlipidemia)  Diabetes  High blood pressure  No significant past surgical history      FAMILY HISTORY:  No pertinent family history in first degree relatives      Social History: Quit smoking 2 years ago. No etoh or illicit drug abuse     Outpatient Medications: Home Medications:  amLODIPine 5 mg oral tablet: 1 tab(s) orally once a day (14 Aug 2019 06:11)  atorvastatin 20 mg oral tablet: 1 tab(s) orally once a day (14 Aug 2019 06:11)  famotidine 20 mg oral tablet: 1 tab(s) orally 2 times a day (14 Aug 2019 06:11)  hydroCHLOROthiazide 12.5 mg oral capsule: 1 cap(s) orally once a day (14 Aug 2019 06:11)  losartan 50 mg oral tablet: 1 tab(s) orally once a day (14 Aug 2019 06:11)  Mintox Maximum Strength oral suspension: 5 milliliter(s) orally 4 times a day (before meals and at bedtime) (14 Aug 2019 06:11)  tamsulosin 0.4 mg oral capsule: 1 cap(s) orally once a day (14 Aug 2019 06:11)      MEDICATIONS  (STANDING):  atorvastatin 20 milliGRAM(s) Oral at bedtime  dextrose 5%. 1000 milliLiter(s) (50 mL/Hr) IV Continuous <Continuous>  dextrose 50% Injectable 12.5 Gram(s) IV Push once  dextrose 50% Injectable 25 Gram(s) IV Push once  dextrose 50% Injectable 25 Gram(s) IV Push once  insulin lispro (HumaLOG) corrective regimen sliding scale   SubCutaneous three times a day before meals  insulin lispro (HumaLOG) corrective regimen sliding scale   SubCutaneous at bedtime  pantoprazole    Tablet 40 milliGRAM(s) Oral before breakfast  sodium chloride 0.9% lock flush 3 milliLiter(s) IV Push every 8 hours  sodium chloride 0.9%. 1000 milliLiter(s) (75 mL/Hr) IV Continuous <Continuous>  tamsulosin 0.4 milliGRAM(s) Oral at bedtime    MEDICATIONS  (PRN):  dextrose 40% Gel 15 Gram(s) Oral once PRN Blood Glucose LESS THAN 70 milliGRAM(s)/deciliter  glucagon  Injectable 1 milliGRAM(s) IntraMuscular once PRN Glucose LESS THAN 70 milligrams/deciliter      Allergies    No Known Allergies    Intolerances      Review of Systems:  Constitutional: No fever, chills   Neuro: No tremors, headache  Cardiovascular: No chest pain, palpitations  Respiratory: No SOB, no cough  GI: No nausea, vomiting, abdominal pain  : No dysuria, polyuria   Skin: no rash, ulcers   Psych: no depression, anxiety   Endocrine: Polydipsia.  no polyphagia    ALL OTHER SYSTEMS REVIEWED AND NEGATIVE        PHYSICAL EXAM:  VITALS: T(C): 36.7 (08-15-19 @ 05:43)  T(F): 98.1 (08-15-19 @ 05:43), Max: 98.1 (08-15-19 @ 05:43)  HR: 80 (08-15-19 @ 13:36) (67 - 80)  BP: 139/65 (08-15-19 @ 13:36) (94/56 - 139/65)  RR:  (17 - 18)  SpO2:  (98% - 99%)  Wt(kg): --  GENERAL: NAD, well-groomed, well-developed  EYES: No proptosis, anicteric  HEENT:  Atraumatic, Normocephalic, moist mucous membranes  THYROID: Normal size, no palpable nodules  RESPIRATORY: Clear to auscultation bilaterally; No rales, rhonchi, wheezing  CARDIOVASCULAR: Regular rate and rhythm; No murmurs; no peripheral edema  GI: Soft, nontender, non distended, normal bowel sounds  SKIN: Dry, intact, No rashes or lesions  NEURO: AOx3, moves all extremities spontaneously   PSYCH: Reactive affect, euthymic mood    POCT Blood Glucose.: 102 mg/dL (08-15-19 @ 13:13)  POCT Blood Glucose.: 105 mg/dL (08-15-19 @ 08:47)  POCT Blood Glucose.: 106 mg/dL (08-14-19 @ 21:07)  POCT Blood Glucose.: 122 mg/dL (08-14-19 @ 17:37)  POCT Blood Glucose.: 115 mg/dL (08-14-19 @ 13:06)  POCT Blood Glucose.: 124 mg/dL (08-14-19 @ 09:06)                            12.4   7.26  )-----------( 279      ( 15 Aug 2019 05:19 )             38.2       08-15    128<L>  |  94<L>  |  13  ----------------------------<  96  4.4   |  21<L>  |  1.06    EGFR if : 91  EGFR if non : 79    Ca    9.1      08-15  Mg     2.1     08-15  Phos  4.0     08-15    TPro  7.3  /  Alb  4.3  /  TBili  0.8  /  DBili  x   /  AST  21  /  ALT  15  /  AlkPhos  101  08-13      Thyroid Function Tests:  08-15 @ 05:19 TSH 3.06 FreeT4 1.17 T3 93.4 Anti TPO -- Anti Thyroglobulin Ab -- TSI --  08-14 @ 07:14 TSH -- FreeT4 1.31 T3 86.8 Anti TPO -- Anti Thyroglobulin Ab -- TSI --      Hemoglobin A1C, Whole Blood: 5.8 % <H> [4.0 - 5.6] (08-15-19 @ 05:19)            Radiology:

## 2019-08-15 NOTE — CONSULT NOTE ADULT - PROBLEM SELECTOR RECOMMENDATION 9
Cortisol 7.1 at 5AM checked for hyponatremia in the setting of polydipsia of 3L/day and concurrent HCTZ use. Urine osmol of 97 and Lisa of 28. Suggestive of psychogenic polydipsia and diuretic use as cause of hyponatremia.   Glucose wnl, BP trending on lower side but patient did receive home 2 bp meds.   Currently using opthalmic prednisolon 1% eye drops that can potentially have systemic effects and lower cortsiol.    - cosyntropin stim at 8AM with baseline total cortisol, ACTH and then   total cortisol 30 mins after stim and total cortisol 60 mins after stim   - if signs of decompensation please give dexamethaxone 4mg IVP then dexamethasone 4mg IVP q12 Cortisol 7.1 at 5AM checked for hyponatremia in the setting of polydipsia of 3L/day and concurrent HCTZ use. Urine osmol of 97 and Lisa of 28. Suggestive of psychogenic polydipsia and diuretic use as cause of hyponatremia.   Glucose wnl, BP trending on lower side but patient did receive home 2 bp meds.   Currently using opthalmic prednisolon 1% eye drops though   - 8AM  total cortisol, ACTH. If total Cortisol >10 at 8AM then no need for stim test.   - if total cortisol <10 please order ACTH stim test with baseline ACTH, total cortisol. Total cortisol at 30 mins and Total cortisol at 60 minutes. Please order total cortisol.   - if signs of decompensation please give dexamethaxone 4mg IVP then dexamethasone 4mg IVP q12 Cortisol 7.1 at 5AM checked for hyponatremia in the setting of polydipsia of 3L/day and concurrent HCTZ use. Urine osmol of 97 and Lisa of 28. Suggestive of psychogenic polydipsia and diuretic use as cause of hyponatremia.   Glucose wnl, BP trending on lower side but patient did receive home 2 bp meds.   Currently using opthalmic prednisolon 1% eye drops though it should not cause secondary AI.   - 8AM  total cortisol, ACTH. If total Cortisol >10 at 8AM then no need for stim test.   - if total cortisol <10 please order ACTH stim test with baseline ACTH, total cortisol. Total cortisol at 30 mins and Total cortisol at 60 minutes. Please order total cortisol.   - if signs of decompensation please give hydrocortisone 50 mg IVP then hydrocortisone 50mg IVP q8h

## 2019-08-16 DIAGNOSIS — R10.84 GENERALIZED ABDOMINAL PAIN: ICD-10-CM

## 2019-08-16 DIAGNOSIS — Z71.89 OTHER SPECIFIED COUNSELING: ICD-10-CM

## 2019-08-16 LAB
ALBUMIN SERPL ELPH-MCNC: 3.7 G/DL — SIGNIFICANT CHANGE UP (ref 3.3–5)
ALP SERPL-CCNC: 85 U/L — SIGNIFICANT CHANGE UP (ref 40–120)
ALT FLD-CCNC: 12 U/L — SIGNIFICANT CHANGE UP (ref 4–41)
ANION GAP SERPL CALC-SCNC: 11 MMO/L — SIGNIFICANT CHANGE UP (ref 7–14)
AST SERPL-CCNC: 15 U/L — SIGNIFICANT CHANGE UP (ref 4–40)
BASOPHILS # BLD AUTO: 0.03 K/UL — SIGNIFICANT CHANGE UP (ref 0–0.2)
BASOPHILS NFR BLD AUTO: 0.4 % — SIGNIFICANT CHANGE UP (ref 0–2)
BILIRUB SERPL-MCNC: 0.3 MG/DL — SIGNIFICANT CHANGE UP (ref 0.2–1.2)
BUN SERPL-MCNC: 17 MG/DL — SIGNIFICANT CHANGE UP (ref 7–23)
CALCIUM SERPL-MCNC: 9.1 MG/DL — SIGNIFICANT CHANGE UP (ref 8.4–10.5)
CHLORIDE SERPL-SCNC: 97 MMOL/L — LOW (ref 98–107)
CO2 SERPL-SCNC: 24 MMOL/L — SIGNIFICANT CHANGE UP (ref 22–31)
CORTIS SERPL-MCNC: 10.4 UG/DL — SIGNIFICANT CHANGE UP (ref 2.7–18.4)
CREAT SERPL-MCNC: 1.21 MG/DL — SIGNIFICANT CHANGE UP (ref 0.5–1.3)
EOSINOPHIL # BLD AUTO: 0.32 K/UL — SIGNIFICANT CHANGE UP (ref 0–0.5)
EOSINOPHIL NFR BLD AUTO: 4.6 % — SIGNIFICANT CHANGE UP (ref 0–6)
GLUCOSE SERPL-MCNC: 96 MG/DL — SIGNIFICANT CHANGE UP (ref 70–99)
HCT VFR BLD CALC: 35.7 % — LOW (ref 39–50)
HGB BLD-MCNC: 11.6 G/DL — LOW (ref 13–17)
IMM GRANULOCYTES NFR BLD AUTO: 0.3 % — SIGNIFICANT CHANGE UP (ref 0–1.5)
LYMPHOCYTES # BLD AUTO: 1.51 K/UL — SIGNIFICANT CHANGE UP (ref 1–3.3)
LYMPHOCYTES # BLD AUTO: 21.6 % — SIGNIFICANT CHANGE UP (ref 13–44)
MAGNESIUM SERPL-MCNC: 1.9 MG/DL — SIGNIFICANT CHANGE UP (ref 1.6–2.6)
MCHC RBC-ENTMCNC: 28.6 PG — SIGNIFICANT CHANGE UP (ref 27–34)
MCHC RBC-ENTMCNC: 32.5 % — SIGNIFICANT CHANGE UP (ref 32–36)
MCV RBC AUTO: 87.9 FL — SIGNIFICANT CHANGE UP (ref 80–100)
MONOCYTES # BLD AUTO: 0.62 K/UL — SIGNIFICANT CHANGE UP (ref 0–0.9)
MONOCYTES NFR BLD AUTO: 8.9 % — SIGNIFICANT CHANGE UP (ref 2–14)
NEUTROPHILS # BLD AUTO: 4.5 K/UL — SIGNIFICANT CHANGE UP (ref 1.8–7.4)
NEUTROPHILS NFR BLD AUTO: 64.2 % — SIGNIFICANT CHANGE UP (ref 43–77)
NRBC # FLD: 0 K/UL — SIGNIFICANT CHANGE UP (ref 0–0)
PHOSPHATE SERPL-MCNC: 4.5 MG/DL — SIGNIFICANT CHANGE UP (ref 2.5–4.5)
PLATELET # BLD AUTO: 257 K/UL — SIGNIFICANT CHANGE UP (ref 150–400)
PMV BLD: 9.6 FL — SIGNIFICANT CHANGE UP (ref 7–13)
POTASSIUM SERPL-MCNC: 4.4 MMOL/L — SIGNIFICANT CHANGE UP (ref 3.5–5.3)
POTASSIUM SERPL-SCNC: 4.4 MMOL/L — SIGNIFICANT CHANGE UP (ref 3.5–5.3)
PROT SERPL-MCNC: 6.4 G/DL — SIGNIFICANT CHANGE UP (ref 6–8.3)
RBC # BLD: 4.06 M/UL — LOW (ref 4.2–5.8)
RBC # FLD: 13.1 % — SIGNIFICANT CHANGE UP (ref 10.3–14.5)
SODIUM SERPL-SCNC: 132 MMOL/L — LOW (ref 135–145)
T3 SERPL-MCNC: 88.3 NG/DL — SIGNIFICANT CHANGE UP (ref 80–200)
T4 FREE SERPL-MCNC: 1.19 NG/DL — SIGNIFICANT CHANGE UP (ref 0.9–1.8)
THYROPEROXIDASE AB SERPL-ACNC: <10 IU/ML — SIGNIFICANT CHANGE UP (ref 0–34.9)
TSH SERPL-MCNC: 3.73 UIU/ML — SIGNIFICANT CHANGE UP (ref 0.27–4.2)
WBC # BLD: 7 K/UL — SIGNIFICANT CHANGE UP (ref 3.8–10.5)
WBC # FLD AUTO: 7 K/UL — SIGNIFICANT CHANGE UP (ref 3.8–10.5)

## 2019-08-16 PROCEDURE — 99232 SBSQ HOSP IP/OBS MODERATE 35: CPT | Mod: GC

## 2019-08-16 RX ORDER — SOD SULF/SODIUM/NAHCO3/KCL/PEG
1000 SOLUTION, RECONSTITUTED, ORAL ORAL EVERY 4 HOURS
Refills: 0 | Status: DISCONTINUED | OUTPATIENT
Start: 2019-08-18 | End: 2019-08-18

## 2019-08-16 RX ADMIN — SODIUM CHLORIDE 3 MILLILITER(S): 9 INJECTION INTRAMUSCULAR; INTRAVENOUS; SUBCUTANEOUS at 22:08

## 2019-08-16 RX ADMIN — SODIUM CHLORIDE 3 MILLILITER(S): 9 INJECTION INTRAMUSCULAR; INTRAVENOUS; SUBCUTANEOUS at 11:59

## 2019-08-16 RX ADMIN — TAMSULOSIN HYDROCHLORIDE 0.4 MILLIGRAM(S): 0.4 CAPSULE ORAL at 21:52

## 2019-08-16 RX ADMIN — PANTOPRAZOLE SODIUM 40 MILLIGRAM(S): 20 TABLET, DELAYED RELEASE ORAL at 06:16

## 2019-08-16 RX ADMIN — ATORVASTATIN CALCIUM 20 MILLIGRAM(S): 80 TABLET, FILM COATED ORAL at 21:52

## 2019-08-16 RX ADMIN — SODIUM CHLORIDE 3 MILLILITER(S): 9 INJECTION INTRAMUSCULAR; INTRAVENOUS; SUBCUTANEOUS at 06:16

## 2019-08-16 NOTE — PROGRESS NOTE ADULT - ASSESSMENT
55 yo M reporting a PMHx of HTN, HLD, T2DM (not on any medication), hypothyroidism (he is unsure the exact diagnosis for his thyroid), and chronic abdominal pain p/w an acute exacerbation of his abdominal pain and hyponatremia. Hyponatremia likely hypovolemic 2/2 poor po intake. Abdominal pain concerning for malignancy vs. PUD.        Problem/Plan - 1:  ·  Problem: Hyponatremia.  Plan:  improving    f/u with renal     Problem/Plan - 2:  ·  Problem: Abdominal pain.  Plan: Chronic abdominal pain previously responsive to anti gas medication and famotidine   -CT A/P  :  no acute pathology   -trial pantoprazole 40mg Qam.   apprecaite GI input : planned EGD colonsocpy     Problem/Plan - 3:  ·  Problem: Hypokalemia.  Plan:  replete and monitor     Problem/Plan - 4:  ·  Problem: Hypertension.  Plan:   -HCTZ 12.5mg qd (holding 2/2 hypovolemia)  holding ARB and norvasc for now : BP on lower NL    Problem/Plan - 5:  ·  Problem: Hypothyroidism.  Plan:  endo appreciated :  subclinical hypothyroidism with TSH 5.18, FT4 1.3, TT3 86. No sxs of hypothyroidism  TSH 3.7, FT4 1.19 , TT3 88. No subclinical or overt hypothyroidism.   - f/u TPO and thyroglobulin ab.      Problem/Plan - 6:  Problem: Diabetes. Plan: Reports h/o T2DM, but takes no medications  -A1c  5.8      Problem/Plan - 7:  ·  Problem: HLD (hyperlipidemia).  Plan: -atorvastatin 20mg.     Problem/Plan - 8:  ·  Problem: BPH (benign prostatic hyperplasia).  Plan: tamsulosin 0.4mg qhs.     Problem/Plan - 9:  ·  Problem: low cortisol :   endo input apprecaited : Cortisol 7.1 at 5AM checked for hyponatremia in the setting of polydipsia of 3L/day and concurrent HCTZ use. Urine osmol of 97 and Lisa of 28. Suggestive of psychogenic polydipsia and diuretic use as cause of hyponatremia.   Glucose wnl, BP stable   Currently using opthalmic prednisolon 1% eye drops though it should not cause secondary AI.   - 8AM  total cortisol >10. no need for stim test. Very low suspicion of AI.   - f/u ACTH.

## 2019-08-16 NOTE — PROGRESS NOTE ADULT - SUBJECTIVE AND OBJECTIVE BOX
[facility]   MD JORDEN JACOBS DO ANGELA WONG, PA    TEL:  OFFICE: [phone]  DR GREER CELL: [phone]  DR. STUART CELL: [phone]  HAL FLORES CELL: [phone]        Patient is a 54y old  Male who presents with a chief complaint of CC: Abdominal pain and hyponatremia (16 Aug 2019 13:07)      Patient seen and examined at bedside. No chest pain/sob. c/o b/l LE pain when walking     VITALS:  T(F): 98.3 (08-16-19 @ 13:02), Max: 98.3 (08-16-19 @ 13:02)  HR: 73 (08-16-19 @ 13:02)  BP: 112/72 (08-16-19 @ 13:02)  RR: 14 (08-16-19 @ 13:02)  SpO2: 100% (08-16-19 @ 13:02)  Wt(kg): --    08-15 @ 07:01  -  08-16 @ 07:00  --------------------------------------------------------  IN: 320 mL / OUT: 0 mL / NET: 320 mL          PHYSICAL EXAM:  Constitutional: NAD  Neck: No JVD  Respiratory: CTAB, no wheezes, rales or rhonchi  Cardiovascular: S1, S2, RRR  Gastrointestinal: BS+, soft, NT/ND  Extremities: No peripheral edema    Hospital Medications:   MEDICATIONS  (STANDING):  atorvastatin 20 milliGRAM(s) Oral at bedtime  pantoprazole    Tablet 40 milliGRAM(s) Oral before breakfast  sodium chloride 0.9% lock flush 3 milliLiter(s) IV Push every 8 hours  sodium chloride 0.9%. 1000 milliLiter(s) (75 mL/Hr) IV Continuous <Continuous>  tamsulosin 0.4 milliGRAM(s) Oral at bedtime      LABS:  08-16    132<L>  |  97<L>  |  17  ----------------------------<  96  4.4   |  24  |  1.21    Ca    9.1      16 Aug 2019 07:58  Phos  4.5     08-16  Mg     1.9     08-16    TPro  6.4  /  Alb  3.7  /  TBili  0.3  /  DBili      /  AST  15  /  ALT  12  /  AlkPhos  85  08-16    Creatinine Trend: 1.21 <--, 1.06 <--, 0.92 <--, 1.02 <--, 1.02 <--, 1.06 <--    Phosphorus Level, Serum: 4.5 mg/dL (08-16 @ 07:58)  Albumin, Serum: 3.7 g/dL (08-16 @ 07:58)                              11.6   7.00  )-----------( 257      ( 16 Aug 2019 07:58 )             35.7     Urine Studies:    Urine Sodium 28      [08-14-19 @ 05:55]  Urine Osmolality 97      [08-14-19 @ 05:55]    HbA1c 5.8      [08-15-19 @ 05:19]  TSH 3.73      [08-16-19 @ 07:58]    HCV 0.07, Nonreactive Hepatitis C AB  S/CO Ratio                        Interpretation  < 1.00                                   Non-Reactive  1.00 - 4.99                         Weakly-Reactive  >= 5.00                                Reactive  Non-Reactive: Aperson with a non-reactive HCV antibody  result is considered uninfected.  No further action is  needed unless recent infection is suspected.  In these  cases, consider repeat testing later to detect  seroconversion..  Weakly-Reactive: HCV antibody test is abnormal, HCV RNA  Qualitative test will follow.  Reactive: HCV antibody test is abnormal, HCV RNA  Qualitative test will follow.  Note: HCV antibody testing is performed on the [employer]   system.      [08-15-19 @ 05:19]      RADIOLOGY & ADDITIONAL STUDIES:

## 2019-08-16 NOTE — PROGRESS NOTE ADULT - SUBJECTIVE AND OBJECTIVE BOX
Chief Complaint: consult for AI and hypothyroidism   History: Feels well with no new complaints. Still has abd bloating.     MEDICATIONS  (STANDING):  atorvastatin 20 milliGRAM(s) Oral at bedtime  pantoprazole    Tablet 40 milliGRAM(s) Oral before breakfast  sodium chloride 0.9% lock flush 3 milliLiter(s) IV Push every 8 hours  sodium chloride 0.9%. 1000 milliLiter(s) (75 mL/Hr) IV Continuous <Continuous>  tamsulosin 0.4 milliGRAM(s) Oral at bedtime    MEDICATIONS  (PRN):  glucagon  Injectable 1 milliGRAM(s) IntraMuscular once PRN Glucose LESS THAN 70 milligrams/deciliter      Allergies    No Known Allergies    Intolerances        PHYSICAL EXAM:  VITALS: T(C): 36.8 (08-16-19 @ 13:02)  T(F): 98.3 (08-16-19 @ 13:02), Max: 98.3 (08-16-19 @ 13:02)  HR: 73 (08-16-19 @ 13:02) (72 - 75)  BP: 112/72 (08-16-19 @ 13:02) (108/58 - 120/78)  RR:  (14 - 17)  SpO2:  (99% - 100%)  Wt(kg): --  GENERAL: NAD, well-groomed, well-developed  RESPIRATORY: Clear to auscultation bilaterally; No rales, rhonchi, wheezing, or rubs  CARDIOVASCULAR: Regular rate and rhythm; No murmurs  NEURO: AOx3, moves all extremities spontaenuously   PSYCH:  reactive affect, euthymic mood      POCT Blood Glucose.: 98 mg/dL (08-15-19 @ 17:39)  POCT Blood Glucose.: 102 mg/dL (08-15-19 @ 13:13)  POCT Blood Glucose.: 105 mg/dL (08-15-19 @ 08:47)  POCT Blood Glucose.: 106 mg/dL (08-14-19 @ 21:07)  POCT Blood Glucose.: 122 mg/dL (08-14-19 @ 17:37)  POCT Blood Glucose.: 115 mg/dL (08-14-19 @ 13:06)  POCT Blood Glucose.: 124 mg/dL (08-14-19 @ 09:06)      08-16    132<L>  |  97<L>  |  17  ----------------------------<  96  4.4   |  24  |  1.21    EGFR if : 78  EGFR if non : 67    Ca    9.1      08-16  Mg     1.9     08-16  Phos  4.5     08-16    TPro  6.4  /  Alb  3.7  /  TBili  0.3  /  DBili  x   /  AST  15  /  ALT  12  /  AlkPhos  85  08-16          Thyroid Function Tests:  08-16 @ 07:58 TSH 3.73 FreeT4 1.19 T3 88.3 Anti TPO -- Anti Thyroglobulin Ab -- TSI --  08-15 @ 05:19 TSH 3.06 FreeT4 1.17 T3 93.4 Anti TPO -- Anti Thyroglobulin Ab -- TSI --      Hemoglobin A1C, Whole Blood: 5.8 % <H> [4.0 - 5.6] (08-15-19 @ 05:19) Chief Complaint: consult for AI and hypothyroidism   History: Feels well with no new complaints. Still has abd bloating.   c/o lower extremity cramping while walking.    MEDICATIONS  (STANDING):  atorvastatin 20 milliGRAM(s) Oral at bedtime  pantoprazole    Tablet 40 milliGRAM(s) Oral before breakfast  sodium chloride 0.9% lock flush 3 milliLiter(s) IV Push every 8 hours  sodium chloride 0.9%. 1000 milliLiter(s) (75 mL/Hr) IV Continuous <Continuous>  tamsulosin 0.4 milliGRAM(s) Oral at bedtime    MEDICATIONS  (PRN):  glucagon  Injectable 1 milliGRAM(s) IntraMuscular once PRN Glucose LESS THAN 70 milligrams/deciliter      Allergies    No Known Allergies    Intolerances        PHYSICAL EXAM:  VITALS: T(C): 36.8 (08-16-19 @ 13:02)  T(F): 98.3 (08-16-19 @ 13:02), Max: 98.3 (08-16-19 @ 13:02)  HR: 73 (08-16-19 @ 13:02) (72 - 75)  BP: 112/72 (08-16-19 @ 13:02) (108/58 - 120/78)  RR:  (14 - 17)  SpO2:  (99% - 100%)  Wt(kg): --  GENERAL: NAD, well-groomed, well-developed  RESPIRATORY: Clear to auscultation bilaterally; No rales, rhonchi, wheezing, or rubs  CARDIOVASCULAR: Regular rate and rhythm; No murmurs  NEURO: AOx3, moves all extremities spontaenuously   PSYCH:  reactive affect, euthymic mood      POCT Blood Glucose.: 98 mg/dL (08-15-19 @ 17:39)  POCT Blood Glucose.: 102 mg/dL (08-15-19 @ 13:13)  POCT Blood Glucose.: 105 mg/dL (08-15-19 @ 08:47)  POCT Blood Glucose.: 106 mg/dL (08-14-19 @ 21:07)  POCT Blood Glucose.: 122 mg/dL (08-14-19 @ 17:37)  POCT Blood Glucose.: 115 mg/dL (08-14-19 @ 13:06)  POCT Blood Glucose.: 124 mg/dL (08-14-19 @ 09:06)      08-16    132<L>  |  97<L>  |  17  ----------------------------<  96  4.4   |  24  |  1.21    EGFR if : 78  EGFR if non : 67    Ca    9.1      08-16  Mg     1.9     08-16  Phos  4.5     08-16    TPro  6.4  /  Alb  3.7  /  TBili  0.3  /  DBili  x   /  AST  15  /  ALT  12  /  AlkPhos  85  08-16          Thyroid Function Tests:  08-16 @ 07:58 TSH 3.73 FreeT4 1.19 T3 88.3 Anti TPO -- Anti Thyroglobulin Ab -- TSI --  08-15 @ 05:19 TSH 3.06 FreeT4 1.17 T3 93.4 Anti TPO -- Anti Thyroglobulin Ab -- TSI --      Hemoglobin A1C, Whole Blood: 5.8 % <H> [4.0 - 5.6] (08-15-19 @ 05:19)

## 2019-08-16 NOTE — PROGRESS NOTE ADULT - PROBLEM SELECTOR PLAN 3
hgb a1c 5.8   - prediabetes not on any medications  - yearly hgb a1c check outpatient  - does not need FS and ISS.

## 2019-08-16 NOTE — CONSULT NOTE ADULT - REASON FOR ADMISSION
CC: Abdominal pain and hyponatremia

## 2019-08-16 NOTE — PROGRESS NOTE ADULT - PROBLEM SELECTOR PLAN 1
Cortisol 7.1 at 5AM checked for hyponatremia in the setting of polydipsia of 3L/day and concurrent HCTZ use. Urine osmol of 97 and Lisa of 28. Suggestive of psychogenic polydipsia and diuretic use as cause of hyponatremia.   Glucose wnl, BP stable   Currently using opthalmic prednisolon 1% eye drops though it should not cause secondary AI.   - 8AM  total cortisol >10. no need for stim test. Very low suspicion of AI.   - f/u ACTH.

## 2019-08-16 NOTE — PROGRESS NOTE ADULT - PROBLEM SELECTOR PLAN 2
subclinical hypothyroidism with TSH 5.18, FT4 1.3, TT3 86. No sxs of hypothyroidism  TSH 3.7, FT4 1.19 , TT3 88. No subclinical or overt hypothyroidism.   - f/u TPO and thyroglobulin ab

## 2019-08-16 NOTE — PROGRESS NOTE ADULT - ASSESSMENT
55 yo M  PMHx of HTN, HLD, hypothyroidism, hx of hyponatremia and hypokalemia present with abd discomfort, found to have severe hyponatremia    Hyponatremia  Acute symptomatic   urine osmo and urine na low likely polydipsia and decrease solid food intake, on HCTZ  Pt was drinking 3L+ of water daily with poor oral intake in the last one week.  Na improving  HCTZ on hold (would avoid in the future)  free water restriction <1L/day, patient educated using  ID #[id number]  monitor bmp  elevated tSH  repeat cortisol 10.4, endo input appreciated   monitor na    Hypokalemia  likely sec to poor po intake  supplemented  monitor    HTN  bp controlled off bp meds   hold bp meds (losartan  50, norvasc 5)  monitor    LE pain  consider CORKY

## 2019-08-16 NOTE — PROGRESS NOTE ADULT - SUBJECTIVE AND OBJECTIVE BOX
Patient is a 54y old  Male who presents with a chief complaint of CC: Abdominal pain and hyponatremia (16 Aug 2019 15:20)                                                               INTERVAL HPI/OVERNIGHT EVENTS:    REVIEW OF SYSTEMS:     CONSTITUTIONAL: No weakness, fevers or chills  EYES/ENT: No visual changes , no ear ache   NECK: No pain or stiffness  RESPIRATORY: No cough, wheezing,  No shortness of breath  CARDIOVASCULAR: No chest pain or palpitations  GASTROINTESTINAL: abdominal pain  . No nausea, vomiting, or hematemesis; No diarrhea or constipation. No melena or hematochezia.  GENITOURINARY: No dysuria, frequency or hematuria  NEUROLOGICAL: No numbness or weakness                                                                                                                                                                                                                                                                                  Medications:  MEDICATIONS  (STANDING):  atorvastatin 20 milliGRAM(s) Oral at bedtime  pantoprazole    Tablet 40 milliGRAM(s) Oral before breakfast  sodium chloride 0.9% lock flush 3 milliLiter(s) IV Push every 8 hours  sodium chloride 0.9%. 1000 milliLiter(s) (75 mL/Hr) IV Continuous <Continuous>  tamsulosin 0.4 milliGRAM(s) Oral at bedtime    MEDICATIONS  (PRN):  glucagon  Injectable 1 milliGRAM(s) IntraMuscular once PRN Glucose LESS THAN 70 milligrams/deciliter       Allergies    No Known Allergies    Intolerances      Vital Signs Last 24 Hrs  T(C): 36.8 (16 Aug 2019 13:02), Max: 36.8 (16 Aug 2019 13:02)  T(F): 98.3 (16 Aug 2019 13:02), Max: 98.3 (16 Aug 2019 13:02)  HR: 73 (16 Aug 2019 13:02) (72 - 75)  BP: 112/72 (16 Aug 2019 13:02) (108/58 - 120/78)  BP(mean): --  RR: 14 (16 Aug 2019 13:02) (14 - 17)  SpO2: 100% (16 Aug 2019 13:02) (99% - 100%)  CAPILLARY BLOOD GLUCOSE          08-15 @ 07:01  -  08-16 @ 07:00  --------------------------------------------------------  IN: 320 mL / OUT: 0 mL / NET: 320 mL      Physical Exam:    General:  NAD   HEENT:  Nonicteric, PERRLA  CV:  RRR, S1S2   Lungs:  CTA B/L, no wheezes, rales, rhonchi  Abdomen:  Soft, non-tender, no distended, positive BS  Extremities:  2+ pulses, no c/c, no edema  Skin:  Warm and dry, no rashes  :  No shell  Neuro:  AAOx3, non-focal, grossly intact                                                                                                                                                                                                                                                                                                LABS:                               11.6   7.00  )-----------( 257      ( 16 Aug 2019 07:58 )             35.7                      08-16    132<L>  |  97<L>  |  17  ----------------------------<  96  4.4   |  24  |  1.21    Ca    9.1      16 Aug 2019 07:58  Phos  4.5     08-16  Mg     1.9     08-16    TPro  6.4  /  Alb  3.7  /  TBili  0.3  /  DBili  x   /  AST  15  /  ALT  12  /  AlkPhos  85  08-16                       R

## 2019-08-16 NOTE — CONSULT NOTE ADULT - ASSESSMENT
53 yo M reporting a PMHx of HTN, HLD, T2DM (not on any medication), hypothyroidism (he is unsure the exact diagnosis for his thyroid), hypokalemia, and chronic diffuse crampy abdominal pain 2-3 years now presenting with an acute exacerbation of his pain.

## 2019-08-16 NOTE — CONSULT NOTE ADULT - SUBJECTIVE AND OBJECTIVE BOX
Chief Complaint:  Patient is a 54y old  Male who presents with a chief complaint of CC: Abdominal pain and hyponatremia (15 Aug 2019 15:40)    Hypokalemia  HLD (hyperlipidemia)  Diabetes  High blood pressure  No significant past surgical history     HPI:  53 yo M reporting a PMHx of HTN, HLD, T2DM (not on any medication), hypothyroidism (he is unsure the exact diagnosis for his thyroid), hypokalemia, and chronic diffuse crampy abdominal pain 2-3 years now presenting with an acute exacerbation of his pain. The pain is diffuse and crampy, it waxes and wanes and causes early satiety as well as a loss of appetite. Patient believes he lost weight, but has not measured. Anti gas medications used to help, but no longer do. Denies N/V and hematemesis. No melena. Can have up to 3 BMs per day, no blood or mucous in the stool. No cp. Does endorse intermittent LE edema and NÚÑEZ. Former "chain smoker." GI consulted for abdominal pain. The patient reports that he has been having ongoing diffuse acute on chronic abdominal pain for years with no resolution to why it is happening. He had an EGD in [facility] about 4 years ago with no acute findings that he can recall, however, has never undergone a colonoscopy. He reports brown colored stools that range from soft to formed depending on what he eats, never any melena or hematochezia. He at times notices that after eating he does become increasingly nauseated and with increased bloating sensations which has caused him to have a fear of po intake. He admits to weight loss but did not quantify      ED Course:  T 97.9, HR 70, /61, RR 16, SpO2 100%%  BMP noted to have Na 120, K 3.3, TSH 5.18, serum osm 270. CXR clear. hgb 12.6.   S/P KCl IV 10meq, KCl PO 40meq (14 Aug 2019 05:58)      No Known Allergies      atorvastatin 20 milliGRAM(s) Oral at bedtime  glucagon  Injectable 1 milliGRAM(s) IntraMuscular once PRN  pantoprazole    Tablet 40 milliGRAM(s) Oral before breakfast  sodium chloride 0.9% lock flush 3 milliLiter(s) IV Push every 8 hours  sodium chloride 0.9%. 1000 milliLiter(s) IV Continuous <Continuous>  tamsulosin 0.4 milliGRAM(s) Oral at bedtime        FAMILY HISTORY:  No pertinent family history in first degree relatives        Review of Systems:    General:  No wt loss, fevers, chills, night sweats, fatigue  Eyes:  Good vision, no reported pain  ENT:  No sore throat, pain, runny nose, dysphagia  CV:  No pain, palpitations, no lightheadedness  Resp:  No dyspnea, cough, tachypnea, wheezing  GI: as above  :  No pain, bleeding, incontinence, nocturia  Muscle:  No pain, weakness  Neuro:  No weakness, tingling, memory problems  Psych:  No fatigue, insomnia, mood problems, depression  Endocrine:  No polyuria, polydypsia, cold/heat intolerance  Heme:  No petechiae, ecchymosis, easy bruisability  Skin:  No rash, tattoos, scars, edema    Relevant Family History:   n/c    Relevant Social History: n/c      Physical Exam:    Vital Signs:  Vital Signs Last 24 Hrs  T(C): 36.8 (16 Aug 2019 13:02), Max: 36.8 (16 Aug 2019 13:02)  T(F): 98.3 (16 Aug 2019 13:02), Max: 98.3 (16 Aug 2019 13:02)  HR: 73 (16 Aug 2019 13:02) (72 - 80)  BP: 112/72 (16 Aug 2019 13:02) (108/58 - 139/65)  BP(mean): 83 (15 Aug 2019 13:36) (83 - 83)  RR: 14 (16 Aug 2019 13:02) (14 - 18)  SpO2: 100% (16 Aug 2019 13:02) (99% - 100%)  Daily     Daily     General:  Appears stated age, well-groomed, nad  HEENT:  NC/AT,  conjunctivae clear and pink, no thyromegaly, nodules, adenopathy, no JVD  Chest:  Full & symmetric excursion, no increased effort, breath sounds clear  Cardiovascular:  Regular rhythm, S1, S2, no murmur/rub/S3/S4, no abdominal bruit, no edema  Abdomen:  Soft, non-tender, non-distended, normoactive bowel sounds,  no masses ,no hepatosplenomeagaly, no signs of chronic liver disease  Extremities:  no cyanosis,clubbing or edema  Skin:  No rash/erythema/ecchymoses/petechiae/wounds/abscess/warm/dry  Neuro/Psych:  A&Ox3  , no asterixis, no tremor, no encephalopathy    Laboratory:                            11.6   7.00  )-----------( 257      ( 16 Aug 2019 07:58 )             35.7     08-16    132<L>  |  97<L>  |  17  ----------------------------<  96  4.4   |  24  |  1.21    Ca    9.1      16 Aug 2019 07:58  Phos  4.5     08-16  Mg     1.9     08-16    TPro  6.4  /  Alb  3.7  /  TBili  0.3  /  DBili  x   /  AST  15  /  ALT  12  /  AlkPhos  85  08-16    LIVER FUNCTIONS - ( 16 Aug 2019 07:58 )  Alb: 3.7 g/dL / Pro: 6.4 g/dL / ALK PHOS: 85 u/L / ALT: 12 u/L / AST: 15 u/L / GGT: x                 Imaging:

## 2019-08-17 LAB
ACTH SER-ACNC: 76.3 PG/ML — HIGH (ref 7.2–63.3)
ANION GAP SERPL CALC-SCNC: 10 MMO/L — SIGNIFICANT CHANGE UP (ref 7–14)
BUN SERPL-MCNC: 22 MG/DL — SIGNIFICANT CHANGE UP (ref 7–23)
CALCIUM SERPL-MCNC: 9.3 MG/DL — SIGNIFICANT CHANGE UP (ref 8.4–10.5)
CHLORIDE SERPL-SCNC: 97 MMOL/L — LOW (ref 98–107)
CO2 SERPL-SCNC: 26 MMOL/L — SIGNIFICANT CHANGE UP (ref 22–31)
CREAT SERPL-MCNC: 1.18 MG/DL — SIGNIFICANT CHANGE UP (ref 0.5–1.3)
GLUCOSE SERPL-MCNC: 95 MG/DL — SIGNIFICANT CHANGE UP (ref 70–99)
HCT VFR BLD CALC: 36.4 % — LOW (ref 39–50)
HGB BLD-MCNC: 11.8 G/DL — LOW (ref 13–17)
MAGNESIUM SERPL-MCNC: 1.9 MG/DL — SIGNIFICANT CHANGE UP (ref 1.6–2.6)
MCHC RBC-ENTMCNC: 28.4 PG — SIGNIFICANT CHANGE UP (ref 27–34)
MCHC RBC-ENTMCNC: 32.4 % — SIGNIFICANT CHANGE UP (ref 32–36)
MCV RBC AUTO: 87.7 FL — SIGNIFICANT CHANGE UP (ref 80–100)
NRBC # FLD: 0 K/UL — SIGNIFICANT CHANGE UP (ref 0–0)
PLATELET # BLD AUTO: 272 K/UL — SIGNIFICANT CHANGE UP (ref 150–400)
PMV BLD: 9.9 FL — SIGNIFICANT CHANGE UP (ref 7–13)
POTASSIUM SERPL-MCNC: 4.3 MMOL/L — SIGNIFICANT CHANGE UP (ref 3.5–5.3)
POTASSIUM SERPL-SCNC: 4.3 MMOL/L — SIGNIFICANT CHANGE UP (ref 3.5–5.3)
RBC # BLD: 4.15 M/UL — LOW (ref 4.2–5.8)
RBC # FLD: 13.2 % — SIGNIFICANT CHANGE UP (ref 10.3–14.5)
SODIUM SERPL-SCNC: 133 MMOL/L — LOW (ref 135–145)
WBC # BLD: 8.09 K/UL — SIGNIFICANT CHANGE UP (ref 3.8–10.5)
WBC # FLD AUTO: 8.09 K/UL — SIGNIFICANT CHANGE UP (ref 3.8–10.5)

## 2019-08-17 RX ADMIN — SODIUM CHLORIDE 3 MILLILITER(S): 9 INJECTION INTRAMUSCULAR; INTRAVENOUS; SUBCUTANEOUS at 05:43

## 2019-08-17 RX ADMIN — SODIUM CHLORIDE 3 MILLILITER(S): 9 INJECTION INTRAMUSCULAR; INTRAVENOUS; SUBCUTANEOUS at 13:07

## 2019-08-17 RX ADMIN — ATORVASTATIN CALCIUM 20 MILLIGRAM(S): 80 TABLET, FILM COATED ORAL at 21:06

## 2019-08-17 RX ADMIN — SODIUM CHLORIDE 3 MILLILITER(S): 9 INJECTION INTRAMUSCULAR; INTRAVENOUS; SUBCUTANEOUS at 21:06

## 2019-08-17 RX ADMIN — TAMSULOSIN HYDROCHLORIDE 0.4 MILLIGRAM(S): 0.4 CAPSULE ORAL at 21:06

## 2019-08-17 RX ADMIN — PANTOPRAZOLE SODIUM 40 MILLIGRAM(S): 20 TABLET, DELAYED RELEASE ORAL at 06:13

## 2019-08-17 NOTE — PROGRESS NOTE ADULT - SUBJECTIVE AND OBJECTIVE BOX
INTERVAL HPI/OVERNIGHT EVENTS:  No new overnight event.  No N/V/D.  Tolerating diet.    Allergies    No Known Allergies    Intolerances    General:  No wt loss, fevers, chills, night sweats, fatigue,   Eyes:  Good vision, no reported pain  ENT:  No sore throat, pain, runny nose, dysphagia  CV:  No pain, palpitations, hypo/hypertension  Resp:  No dyspnea, cough, tachypnea, wheezing  GI:  No pain, No nausea, No vomiting, No diarrhea, No constipation, No weight loss, No fever, No pruritis, No rectal bleeding, No tarry stools, No dysphagia,  :  No pain, bleeding, incontinence, nocturia  Muscle:  No pain, weakness  Neuro:  No weakness, tingling, memory problems  Psych:  No fatigue, insomnia, mood problems, depression  Endocrine:  No polyuria, polydipsia, cold/heat intolerance  Heme:  No petechiae, ecchymosis, easy bruisability  Skin:  No rash, tattoos, scars, edema      PHYSICAL EXAM:   Vital Signs:  Vital Signs Last 24 Hrs  T(C): 36.6 (17 Aug 2019 12:18), Max: 37.2 (16 Aug 2019 20:33)  T(F): 97.9 (17 Aug 2019 12:18), Max: 98.9 (16 Aug 2019 20:33)  HR: 73 (17 Aug 2019 12:18) (69 - 73)  BP: 115/74 (17 Aug 2019 12:18) (109/59 - 115/74)  BP(mean): --  RR: 17 (17 Aug 2019 12:18) (17 - 18)  SpO2: 100% (17 Aug 2019 12:18) (98% - 100%)  Daily     Daily I&O's Summary      GENERAL:  Appears stated age, well-groomed, well-nourished, no distress  HEENT:  NC/AT,  conjunctivae clear and pink, no thyromegaly, nodules, adenopathy, no JVD, sclera -anicteric  CHEST:  Full & symmetric excursion, no increased effort, breath sounds clear  HEART:  Regular rhythm, S1, S2, no murmur/rub/S3/S4, no abdominal bruit, no edema  ABDOMEN:  Soft, non-tender, non-distended, normoactive bowel sounds,  no masses ,no hepato-splenomegaly, no signs of chronic liver disease  EXTEREMITIES:  no cyanosis,clubbing or edema  SKIN:  No rash/erythema/ecchymoses/petechiae/wounds/abscess/warm/dry  NEURO:  Alert, oriented, no asterixis, no tremor, no encephalopathy      LABS:                        11.8   8.09  )-----------( 272      ( 17 Aug 2019 07:25 )             36.4     08-17    133<L>  |  97<L>  |  22  ----------------------------<  95  4.3   |  26  |  1.18    Ca    9.3      17 Aug 2019 07:25  Phos  4.5     08-16  Mg     1.9     08-17    TPro  6.4  /  Alb  3.7  /  TBili  0.3  /  DBili  x   /  AST  15  /  ALT  12  /  AlkPhos  85  08-16        amylase   lipase  RADIOLOGY & ADDITIONAL TESTS:

## 2019-08-17 NOTE — PROGRESS NOTE ADULT - SUBJECTIVE AND OBJECTIVE BOX
Patient is a 54y old  Male who presents with a chief complaint of CC: Abdominal pain and hyponatremia (17 Aug 2019 14:56)                                                               INTERVAL HPI/OVERNIGHT EVENTS:    REVIEW OF SYSTEMS:     CONSTITUTIONAL: No weakness, fevers or chills  EYES/ENT: No visual changes , no ear ache   NECK: No pain or stiffness  RESPIRATORY: No cough, wheezing,  No shortness of breath  CARDIOVASCULAR: No chest pain or palpitations  GASTROINTESTINAL: No abdominal pain  . No nausea, vomiting, or hematemesis; No diarrhea or constipation. No melena or hematochezia.  GENITOURINARY: No dysuria, frequency or hematuria  NEUROLOGICAL: No numbness or weakness  SKIN: No itching, burning, rashes, or lesions                                                                                                                                                                                                                                                                                 Medications:  MEDICATIONS  (STANDING):  atorvastatin 20 milliGRAM(s) Oral at bedtime  pantoprazole    Tablet 40 milliGRAM(s) Oral before breakfast  sodium chloride 0.9% lock flush 3 milliLiter(s) IV Push every 8 hours  sodium chloride 0.9%. 1000 milliLiter(s) (75 mL/Hr) IV Continuous <Continuous>  tamsulosin 0.4 milliGRAM(s) Oral at bedtime    MEDICATIONS  (PRN):  glucagon  Injectable 1 milliGRAM(s) IntraMuscular once PRN Glucose LESS THAN 70 milligrams/deciliter       Allergies    No Known Allergies    Intolerances      Vital Signs Last 24 Hrs  T(C): 36.7 (17 Aug 2019 20:04), Max: 36.7 (17 Aug 2019 05:44)  T(F): 98 (17 Aug 2019 20:04), Max: 98.1 (17 Aug 2019 05:44)  HR: 72 (17 Aug 2019 20:04) (69 - 73)  BP: 118/71 (17 Aug 2019 20:04) (111/62 - 118/71)  BP(mean): --  RR: 18 (17 Aug 2019 20:04) (17 - 18)  SpO2: 99% (17 Aug 2019 20:04) (98% - 100%)  CAPILLARY BLOOD GLUCOSE      POCT Blood Glucose.: 97 mg/dL (17 Aug 2019 12:16)      Physical Exam:    Daily     Daily   General:  Well appearing, NAD, not cachetic  HEENT:  Nonicteric, PERRLA  CV:  RRR, S1S2   Lungs:  CTA B/L, no wheezes, rales, rhonchi  Abdomen:  Soft, non-tender, no distended, positive BS  Extremities:  2+ pulses, no c/c, no edema  Skin:  Warm and dry, no rashes  :  No shell  Neuro:  AAOx3, non-focal, grossly intact                                                                                                                                                                                                                                                                                                LABS:                               11.8   8.09  )-----------( 272      ( 17 Aug 2019 07:25 )             36.4                      08-17    133<L>  |  97<L>  |  22  ----------------------------<  95  4.3   |  26  |  1.18    Ca    9.3      17 Aug 2019 07:25  Phos  4.5     08-16  Mg     1.9     08-17    TPro  6.4  /  Alb  3.7  /  TBili  0.3  /  DBili  x   /  AST  15  /  ALT  12  /  AlkPhos  85  08-16                       RADIOLOGY & ADDITIONAL TESTS         I personally reviewed: [  ]EKG   [  ]CXR    [  ] CT      A/P:         Discussed with :     [facility] consultants' Notes   Time spent : Patient is a 54y old  Male who presents with a chief complaint of CC: Abdominal pain and hyponatremia (17 Aug 2019 14:56)                                                               INTERVAL HPI/OVERNIGHT EVENTS:    REVIEW OF SYSTEMS:     CONSTITUTIONAL: No weakness, fevers or chills  EYES/ENT: No visual changes , no ear ache   NECK: No pain or stiffness  RESPIRATORY: No cough, wheezing,  No shortness of breath  CARDIOVASCULAR: No chest pain or palpitations  GASTROINTESTINAL: No abdominal pain  . No nausea, vomiting, or hematemesis; No diarrhea or constipation. No melena or hematochezia.  GENITOURINARY: No dysuria, frequency or hematuria  NEUROLOGICAL: No numbness or weakness  SKIN: No itching, burning, rashes, or lesions                                                                                                                                                                                                                                                                                 Medications:  MEDICATIONS  (STANDING):  atorvastatin 20 milliGRAM(s) Oral at bedtime  pantoprazole    Tablet 40 milliGRAM(s) Oral before breakfast  sodium chloride 0.9% lock flush 3 milliLiter(s) IV Push every 8 hours  sodium chloride 0.9%. 1000 milliLiter(s) (75 mL/Hr) IV Continuous <Continuous>  tamsulosin 0.4 milliGRAM(s) Oral at bedtime    MEDICATIONS  (PRN):  glucagon  Injectable 1 milliGRAM(s) IntraMuscular once PRN Glucose LESS THAN 70 milligrams/deciliter       Allergies    No Known Allergies    Intolerances      Vital Signs Last 24 Hrs  T(C): 36.7 (17 Aug 2019 20:04), Max: 36.7 (17 Aug 2019 05:44)  T(F): 98 (17 Aug 2019 20:04), Max: 98.1 (17 Aug 2019 05:44)  HR: 72 (17 Aug 2019 20:04) (69 - 73)  BP: 118/71 (17 Aug 2019 20:04) (111/62 - 118/71)  BP(mean): --  RR: 18 (17 Aug 2019 20:04) (17 - 18)  SpO2: 99% (17 Aug 2019 20:04) (98% - 100%)  CAPILLARY BLOOD GLUCOSE      POCT Blood Glucose.: 97 mg/dL (17 Aug 2019 12:16)      Physical Exam:    General:  Well appearing, NAD, not cachetic  HEENT:  Nonicteric, PERRLA  CV:  RRR, S1S2   Lungs:  CTA B/L, no wheezes, rales, rhonchi  Abdomen:  Soft, non-tender, no distended, positive BS  Extremities:  2+ pulses, no c/c, no edema  Skin:  Warm and dry, no rashes  :  No shell  Neuro:  AAOx3, non-focal, grossly intact                                                                                                                                                                                                                                                                                                LABS:                               11.8   8.09  )-----------( 272      ( 17 Aug 2019 07:25 )             36.4                      08-17    133<L>  |  97<L>  |  22  ----------------------------<  95  4.3   |  26  |  1.18    Ca    9.3      17 Aug 2019 07:25  Phos  4.5     08-16  Mg     1.9     08-17    TPro  6.4  /  Alb  3.7  /  TBili  0.3  /  DBili  x   /  AST  15  /  ALT  12  /  AlkPhos  85  08-16

## 2019-08-17 NOTE — PROGRESS NOTE ADULT - ASSESSMENT
55 yo M reporting a PMHx of HTN, HLD, T2DM (not on any medication), hypothyroidism (he is unsure the exact diagnosis for his thyroid), and chronic abdominal pain p/w an acute exacerbation of his abdominal pain and hyponatremia. Hyponatremia likely hypovolemic 2/2 poor po intake. Abdominal pain concerning for malignancy vs. PUD.        Problem/Plan - 1:  ·  Problem: Hyponatremia.  Plan:  improving    f/u with renal     Problem/Plan - 2:  ·  Problem: Abdominal pain.  Plan: Chronic abdominal pain previously responsive to anti gas medication and famotidine   -CT A/P  :  no acute pathology   -trial pantoprazole 40mg Qam.   apprecaite GI input : planned EGD colonsocpy     Problem/Plan - 3:  ·  Problem: Hypokalemia.  Plan:  replete and monitor     Problem/Plan - 4:  ·  Problem: Hypertension.  Plan:   -HCTZ 12.5mg qd (holding 2/2 hypovolemia)  holding ARB and norvasc for now : BP on lower NL    Problem/Plan - 5:  ·  Problem: Hypothyroidism.  Plan:  endo appreciated :  subclinical hypothyroidism with TSH 5.18, FT4 1.3, TT3 86. No sxs of hypothyroidism  TSH 3.7, FT4 1.19 , TT3 88. No subclinical or overt hypothyroidism.   - f/u TPO and thyroglobulin ab.      Problem/Plan - 6:  Problem: Diabetes. Plan: Reports h/o T2DM, but takes no medications  -A1c  5.8      Problem/Plan - 7:  ·  Problem: HLD (hyperlipidemia).  Plan: -atorvastatin 20mg.     Problem/Plan - 8:  ·  Problem: BPH (benign prostatic hyperplasia).  Plan: tamsulosin 0.4mg qhs.     Problem/Plan - 9:  ·  Problem: low cortisol :   endo input apprecaited : Cortisol 7.1 at 5AM checked for hyponatremia in the setting of polydipsia of 3L/day and concurrent HCTZ use. Urine osmol of 97 and Lisa of 28. Suggestive of psychogenic polydipsia and diuretic use as cause of hyponatremia.   Glucose wnl, BP stable   Currently using opthalmic prednisolon 1% eye drops though it should not cause secondary AI.   - 8AM  total cortisol >10. no need for stim test. Very low suspicion of AI.   - f/u ACTH. 55 yo M reporting a PMHx of HTN, HLD, T2DM (not on any medication), hypothyroidism (he is unsure the exact diagnosis for his thyroid), and chronic abdominal pain p/w an acute exacerbation of his abdominal pain and hyponatremia. Hyponatremia likely hypovolemic 2/2 poor po intake. Abdominal pain concerning for malignancy vs. PUD.        Problem/Plan - 1:  ·  Problem: Hyponatremia.  Plan:  improving    f/u with renal     Problem/Plan - 2:  ·  Problem: Abdominal pain.  Plan: Chronic abdominal pain previously responsive to anti gas medication and famotidine   -CT A/P  :  no acute pathology   -trial pantoprazole 40mg Qam.   apprecaite GI input : planned EGD colonsocpy     Problem/Plan - 3:  ·  Problem: Hypokalemia.  Plan:  replete and monitor     Problem/Plan - 4:  ·  Problem: Hypertension.  Plan:   -HCTZ 12.5mg qd (holding 2/2 hypovolemia)  holding ARB and norvasc for now : BP on lower NL    Problem/Plan - 5:  ·  Problem: Hypothyroidism.  Plan:  endo appreciated :  subclinical hypothyroidism with TSH 5.18, FT4 1.3, TT3 86. No sxs of hypothyroidism  TSH 3.7, FT4 1.19 , TT3 88. No subclinical or overt hypothyroidism.   - f/u TPO and thyroglobulin ab.      Problem/Plan - 6:  Problem: Diabetes. Plan: Reports h/o T2DM, but takes no medications  -A1c  5.8      Problem/Plan - 7:  ·  Problem: HLD (hyperlipidemia).  Plan: -atorvastatin 20mg.     Problem/Plan - 8:  ·  Problem: BPH (benign prostatic hyperplasia).  Plan: tamsulosin 0.4mg qhs.     Problem/Plan - 9:  ·  Problem: low cortisol :   endo input apprecaited : Cortisol 7.1 at 5AM checked for hyponatremia in the setting of polydipsia of 3L/day and concurrent HCTZ use. Urine osmol of 97 and Lisa of 28. Suggestive of psychogenic polydipsia and diuretic use as cause of hyponatremia.   Glucose wnl, BP stable   Currently using opthalmic prednisolon 1% eye drops though it should not cause secondary AI.   - 8AM  total cortisol >10. no need for stim test. Very low suspicion of AI.

## 2019-08-17 NOTE — PROGRESS NOTE ADULT - ATTENDING COMMENTS
Advanced care planning was discussed with patient and family.  Advanced care planning forms were reviewed and discussed.  Risks, benefits and alternatives of gastroenterologic procedures were discussed in detail and all questions were answered.    30 minutes spent.
8AM cortisol appropriate. TFTs wnl. No indication of endocrine cause of hyponatremia.  Will sign off please call if questions.

## 2019-08-17 NOTE — PROGRESS NOTE ADULT - ASSESSMENT
abd pain    egd/colon on monday  clears in am  r/b/a were explained and he is aware of plan  ppi once a day  to follow

## 2019-08-18 LAB
ANION GAP SERPL CALC-SCNC: 13 MMO/L — SIGNIFICANT CHANGE UP (ref 7–14)
BUN SERPL-MCNC: 20 MG/DL — SIGNIFICANT CHANGE UP (ref 7–23)
CALCIUM SERPL-MCNC: 9.4 MG/DL — SIGNIFICANT CHANGE UP (ref 8.4–10.5)
CHLORIDE SERPL-SCNC: 96 MMOL/L — LOW (ref 98–107)
CO2 SERPL-SCNC: 23 MMOL/L — SIGNIFICANT CHANGE UP (ref 22–31)
CREAT SERPL-MCNC: 1.19 MG/DL — SIGNIFICANT CHANGE UP (ref 0.5–1.3)
GLUCOSE SERPL-MCNC: 96 MG/DL — SIGNIFICANT CHANGE UP (ref 70–99)
HCT VFR BLD CALC: 38.3 % — LOW (ref 39–50)
HGB BLD-MCNC: 12.2 G/DL — LOW (ref 13–17)
MAGNESIUM SERPL-MCNC: 2.1 MG/DL — SIGNIFICANT CHANGE UP (ref 1.6–2.6)
MCHC RBC-ENTMCNC: 28.2 PG — SIGNIFICANT CHANGE UP (ref 27–34)
MCHC RBC-ENTMCNC: 31.9 % — LOW (ref 32–36)
MCV RBC AUTO: 88.7 FL — SIGNIFICANT CHANGE UP (ref 80–100)
NRBC # FLD: 0 K/UL — SIGNIFICANT CHANGE UP (ref 0–0)
PLATELET # BLD AUTO: 277 K/UL — SIGNIFICANT CHANGE UP (ref 150–400)
PMV BLD: 9.8 FL — SIGNIFICANT CHANGE UP (ref 7–13)
POTASSIUM SERPL-MCNC: 4.4 MMOL/L — SIGNIFICANT CHANGE UP (ref 3.5–5.3)
POTASSIUM SERPL-SCNC: 4.4 MMOL/L — SIGNIFICANT CHANGE UP (ref 3.5–5.3)
RBC # BLD: 4.32 M/UL — SIGNIFICANT CHANGE UP (ref 4.2–5.8)
RBC # FLD: 13.1 % — SIGNIFICANT CHANGE UP (ref 10.3–14.5)
SODIUM SERPL-SCNC: 132 MMOL/L — LOW (ref 135–145)
WBC # BLD: 9.11 K/UL — SIGNIFICANT CHANGE UP (ref 3.8–10.5)
WBC # FLD AUTO: 9.11 K/UL — SIGNIFICANT CHANGE UP (ref 3.8–10.5)

## 2019-08-18 RX ORDER — SOD SULF/SODIUM/NAHCO3/KCL/PEG
1 SOLUTION, RECONSTITUTED, ORAL ORAL EVERY 4 HOURS
Refills: 0 | Status: COMPLETED | OUTPATIENT
Start: 2019-08-18 | End: 2019-08-18

## 2019-08-18 RX ADMIN — SODIUM CHLORIDE 3 MILLILITER(S): 9 INJECTION INTRAMUSCULAR; INTRAVENOUS; SUBCUTANEOUS at 06:07

## 2019-08-18 RX ADMIN — Medication 1 LITER(S): at 17:36

## 2019-08-18 RX ADMIN — Medication 10 MILLIGRAM(S): at 16:54

## 2019-08-18 RX ADMIN — Medication 10 MILLIGRAM(S): at 20:04

## 2019-08-18 RX ADMIN — SODIUM CHLORIDE 3 MILLILITER(S): 9 INJECTION INTRAMUSCULAR; INTRAVENOUS; SUBCUTANEOUS at 21:23

## 2019-08-18 RX ADMIN — Medication 1 LITER(S): at 21:24

## 2019-08-18 RX ADMIN — SODIUM CHLORIDE 3 MILLILITER(S): 9 INJECTION INTRAMUSCULAR; INTRAVENOUS; SUBCUTANEOUS at 13:17

## 2019-08-18 RX ADMIN — TAMSULOSIN HYDROCHLORIDE 0.4 MILLIGRAM(S): 0.4 CAPSULE ORAL at 21:23

## 2019-08-18 RX ADMIN — PANTOPRAZOLE SODIUM 40 MILLIGRAM(S): 20 TABLET, DELAYED RELEASE ORAL at 06:06

## 2019-08-18 RX ADMIN — ATORVASTATIN CALCIUM 20 MILLIGRAM(S): 80 TABLET, FILM COATED ORAL at 21:23

## 2019-08-18 NOTE — PROGRESS NOTE ADULT - ASSESSMENT
55 yo M  PMHx of HTN, HLD, hypothyroidism, hx of hyponatremia and hypokalemia present with abd discomfort, found to have severe hyponatremia    Hyponatremia  Acute and symptomatic\.  urine osmo and urine na low likely polydipsia and decrease solid food intake, on HCTZ  Pt was drinking 3L+ of water daily with poor oral intake in the last one week.  Na improving  HCTZ on hold (would avoid in the future)  free water restriction <1L/day, patient educated using  ID #[id number]  monitor bmp  elevated tSH  repeat cortisol 10.4, endo input appreciated   monitor na    Hypokalemia  likely sec to poor po intake  supplemented  monitor    HTN  bp controlled off bp meds   hold bp meds (losartan  50, norvasc 5)  monitor    LE pain  consider CORKY

## 2019-08-18 NOTE — PROGRESS NOTE ADULT - ASSESSMENT
55 yo M reporting a PMHx of HTN, HLD, T2DM (not on any medication), hypothyroidism (he is unsure the exact diagnosis for his thyroid), and chronic abdominal pain p/w an acute exacerbation of his abdominal pain and hyponatremia. Hyponatremia likely hypovolemic 2/2 poor po intake. Abdominal pain concerning for malignancy vs. PUD.        Problem/Plan - 1:  ·  Problem: Hyponatremia.  Plan:  improving    f/u with renal     Problem/Plan - 2:  ·  Problem: Abdominal pain.  Plan: Chronic abdominal pain previously responsive to anti gas medication and famotidine   -CT A/P  :  no acute pathology   -trial pantoprazole 40mg Qam.   apprecaite GI input : planned EGD colonsocpy     Problem/Plan - 3:  ·  Problem: Hypokalemia.  Plan:  replete and monitor     Problem/Plan - 4:  ·  Problem: Hypertension.  Plan:   hold HCTZ 12.5mg qd  holding ARB and norvasc for now : BP on lower NL    Problem/Plan - 5:  ·  Problem: Hypothyroidism.  Plan:  endo appreciated :  subclinical hypothyroidism with TSH 5.18, FT4 1.3, TT3 86. No sxs of hypothyroidism  TSH 3.7, FT4 1.19 , TT3 88. No subclinical or overt hypothyroidism.   - f/u TPO and thyroglobulin ab.      Problem/Plan - 6:  Problem: Diabetes. Plan: Reports h/o T2DM, but takes no medications  -A1c  5.8      Problem/Plan - 7:  ·  Problem: HLD (hyperlipidemia).  Plan: -atorvastatin 20mg.     Problem/Plan - 8:  ·  Problem: BPH (benign prostatic hyperplasia).  Plan: tamsulosin 0.4mg qhs.     Problem/Plan - 9:  ·  Problem: low cortisol :   endo input apprecaited : Cortisol 7.1 at 5AM checked for hyponatremia in the setting of polydipsia of 3L/day and concurrent HCTZ use. Urine osmol of 97 and Lisa of 28. Suggestive of psychogenic polydipsia and diuretic use as cause of hyponatremia.   Glucose wnl, BP stable   Currently using opthalmic prednisolon 1% eye drops though it should not cause secondary AI.   - 8AM  total cortisol >10. no need for stim test. Very low suspicion of AI.

## 2019-08-18 NOTE — PROGRESS NOTE ADULT - SUBJECTIVE AND OBJECTIVE BOX
Patient is a 54y old  Male who presents with a chief complaint of CC: Abdominal pain and hyponatremia (18 Aug 2019 12:30)                                                               INTERVAL HPI/OVERNIGHT EVENTS:    REVIEW OF SYSTEMS:     CONSTITUTIONAL: No weakness, fevers or chills  EYES/ENT: No visual changes , no ear ache   NECK: No pain or stiffness  RESPIRATORY: No cough, wheezing,  No shortness of breath  CARDIOVASCULAR: No chest pain or palpitations  GASTROINTESTINAL: No abdominal pain  . No nausea, vomiting, or hematemesis; No diarrhea or constipation. No melena or hematochezia.  GENITOURINARY: No dysuria, frequency or hematuria  NEUROLOGICAL: No numbness or weakness  SKIN: No itching, burning, rashes, or lesions                                                                                                                                                                                                                                                                                 Medications:  MEDICATIONS  (STANDING):  atorvastatin 20 milliGRAM(s) Oral at bedtime  pantoprazole    Tablet 40 milliGRAM(s) Oral before breakfast  sodium chloride 0.9% lock flush 3 milliLiter(s) IV Push every 8 hours  sodium chloride 0.9%. 1000 milliLiter(s) (75 mL/Hr) IV Continuous <Continuous>  tamsulosin 0.4 milliGRAM(s) Oral at bedtime    MEDICATIONS  (PRN):  glucagon  Injectable 1 milliGRAM(s) IntraMuscular once PRN Glucose LESS THAN 70 milligrams/deciliter       Allergies    No Known Allergies    Intolerances      Vital Signs Last 24 Hrs  T(C): 37.1 (18 Aug 2019 21:25), Max: 37.1 (18 Aug 2019 21:25)  T(F): 98.7 (18 Aug 2019 21:25), Max: 98.7 (18 Aug 2019 21:25)  HR: 72 (18 Aug 2019 21:25) (71 - 72)  BP: 117/72 (18 Aug 2019 21:25) (109/68 - 117/72)  BP(mean): --  RR: 16 (18 Aug 2019 21:25) (16 - 17)  SpO2: 98% (18 Aug 2019 21:25) (98% - 98%)  CAPILLARY BLOOD GLUCOSE          Physical Exam:    Daily     Daily   General:  Well appearing, NAD, not cachetic  HEENT:  Nonicteric, PERRLA  CV:  RRR, S1S2   Lungs:  CTA B/L, no wheezes, rales, rhonchi  Abdomen:  Soft, non-tender, no distended, positive BS  Extremities:  2+ pulses, no c/c, no edema  Skin:  Warm and dry, no rashes  :  No shell  Neuro:  AAOx3, non-focal, grossly intact                                                                                                                                                                                                                                                                                                LABS:                               12.2   9.11  )-----------( 277      ( 18 Aug 2019 04:20 )             38.3                      08-18    132<L>  |  96<L>  |  20  ----------------------------<  96  4.4   |  23  |  1.19    Ca    9.4      18 Aug 2019 04:20  Mg     2.1     08-18                         RADIOLOGY & ADDITIONAL TESTS         I personally reviewed: [  ]EKG   [  ]CXR    [  ] CT      A/P:         Discussed with :     [facility] consultants' Notes   Time spent : Patient is a 54y old  Male who presents with a chief complaint of CC: Abdominal pain and hyponatremia (18 Aug 2019 12:30)                                                               INTERVAL HPI/OVERNIGHT EVENTS:    REVIEW OF SYSTEMS:     CONSTITUTIONAL: No weakness, fevers or chills  EYES/ENT: No visual changes , no ear ache   NECK: No pain or stiffness  RESPIRATORY: No cough, wheezing,  No shortness of breath  CARDIOVASCULAR: No chest pain or palpitations  GASTROINTESTINAL: No abdominal pain  . No nausea, vomiting, or hematemesis; No diarrhea or constipation. No melena or hematochezia.  GENITOURINARY: No dysuria, frequency or hematuria  NEUROLOGICAL: No numbness or weakness  SKIN: No itching, burning, rashes, or lesions                                                                                                                                                                                                                                                                                 Medications:  MEDICATIONS  (STANDING):  atorvastatin 20 milliGRAM(s) Oral at bedtime  pantoprazole    Tablet 40 milliGRAM(s) Oral before breakfast  sodium chloride 0.9% lock flush 3 milliLiter(s) IV Push every 8 hours  sodium chloride 0.9%. 1000 milliLiter(s) (75 mL/Hr) IV Continuous <Continuous>  tamsulosin 0.4 milliGRAM(s) Oral at bedtime    MEDICATIONS  (PRN):  glucagon  Injectable 1 milliGRAM(s) IntraMuscular once PRN Glucose LESS THAN 70 milligrams/deciliter       Allergies    No Known Allergies    Intolerances      Vital Signs Last 24 Hrs  T(C): 37.1 (18 Aug 2019 21:25), Max: 37.1 (18 Aug 2019 21:25)  T(F): 98.7 (18 Aug 2019 21:25), Max: 98.7 (18 Aug 2019 21:25)  HR: 72 (18 Aug 2019 21:25) (71 - 72)  BP: 117/72 (18 Aug 2019 21:25) (109/68 - 117/72)  BP(mean): --  RR: 16 (18 Aug 2019 21:25) (16 - 17)  SpO2: 98% (18 Aug 2019 21:25) (98% - 98%)  CAPILLARY BLOOD GLUCOSE          Physical Exam:      General:  Well appearing, NAD, not cachetic  HEENT:  Nonicteric, PERRLA  CV:  RRR, S1S2   Lungs:  CTA B/L, no wheezes, rales, rhonchi  Abdomen:  Soft, non-tender, no distended, positive BS  Extremities:  2+ pulses, no c/c, no edema  Skin:  Warm and dry, no rashes  :  No suleman  Neuro:  AAOx3, non-focal, grossly intact                                                                                                                                                                                                                                                                                                LABS:                               12.2   9.11  )-----------( 277      ( 18 Aug 2019 04:20 )             38.3                      08-18    132<L>  |  96<L>  |  20  ----------------------------<  96  4.4   |  23  |  1.19    Ca    9.4      18 Aug 2019 04:20  Mg     2.1     08-18

## 2019-08-18 NOTE — PROGRESS NOTE ADULT - SUBJECTIVE AND OBJECTIVE BOX
MD DAHLIA  54y  Patient is a 54y old  Male who presents with a chief complaint of CC: Abdominal pain and hyponatremia (18 Aug 2019 12:11)    HPI:  This is a patient who has Hyponatremia with Hypo-osmolar urine consistent with either Polydipsia or potomania. Improved at present.    HEALTH ISSUES - PROBLEM Dx:  ACP (advance care planning): ACP (advance care planning)  Abdominal pain, chronic, generalized: Abdominal pain, chronic, generalized  Prediabetes: Prediabetes  Subclinical hypothyroidism: Subclinical hypothyroidism  Adrenal insufficiency: Adrenal insufficiency  Preventive measure: Preventive measure  BPH (benign prostatic hyperplasia): BPH (benign prostatic hyperplasia)  HLD (hyperlipidemia): HLD (hyperlipidemia)  Diabetes: Diabetes  Hypothyroidism: Hypothyroidism  Hypertension: Hypertension  Hypokalemia: Hypokalemia  Abdominal pain: Abdominal pain  Hyponatremia: Hyponatremia        MEDICATIONS  (STANDING):  atorvastatin 20 milliGRAM(s) Oral at bedtime  bisacodyl 10 milliGRAM(s) Oral at bedtime  pantoprazole    Tablet 40 milliGRAM(s) Oral before breakfast  polyethylene glycol/electrolyte Solution 1 Liter(s) Oral every 4 hours  sodium chloride 0.9% lock flush 3 milliLiter(s) IV Push every 8 hours  sodium chloride 0.9%. 1000 milliLiter(s) (75 mL/Hr) IV Continuous <Continuous>  tamsulosin 0.4 milliGRAM(s) Oral at bedtime    MEDICATIONS  (PRN):  glucagon  Injectable 1 milliGRAM(s) IntraMuscular once PRN Glucose LESS THAN 70 milligrams/deciliter    Vital Signs Last 24 Hrs  T(C): 36.6 (18 Aug 2019 06:00), Max: 36.7 (17 Aug 2019 20:04)  T(F): 97.8 (18 Aug 2019 06:00), Max: 98 (17 Aug 2019 20:04)  HR: 71 (18 Aug 2019 06:00) (71 - 72)  BP: 109/68 (18 Aug 2019 06:00) (109/68 - 118/71)  BP(mean): --  RR: 17 (18 Aug 2019 06:00) (17 - 18)  SpO2: 98% (18 Aug 2019 06:00) (98% - 99%)  Daily     Daily     PHYSICAL EXAM:  Constitutional:  He appears comfortable and not distressed. Not diaphoretic.    Neck:  The thyroid is normal. Trachea is midline.     Respiratory: The lungs are clear to auscultation. No dullness and expansion is normal.    Cardiovascular: S1 and S2 are normal. No mummurs, rubs or gallops are present.    Gastrointestinal: The abdomen is soft. No tenderness is present. No masses are present. Bowel sounds are normal.    Genitourinary: The bladder is not distended. No CVA tenderness is present.    Extremities: No edema is noted. No deformities are present.    Neurological: Cognition is normal. Tone, power and sensation are normal. Gait is steady.    Skin: No leasions are seen  or palpated.    Lymph Nodes: No lymphadenopathy is present.    Psychiatric: Mood is appropriate. No hallucinations or flight of ideas are noted.                              12.2   9.11  )-----------( 277      ( 18 Aug 2019 04:20 )             38.3     08-18    132<L>  |  96<L>  |  20  ----------------------------<  96  4.4   |  23  |  1.19    Ca    9.4      18 Aug 2019 04:20  Mg     2.1     08-18

## 2019-08-18 NOTE — PROGRESS NOTE ADULT - SUBJECTIVE AND OBJECTIVE BOX
INTERVAL HPI/OVERNIGHT EVENTS:  No new overnight event.  No N/V/D.  Tolerating diet.    Allergies    No Known Allergies    Intolerances    General:  No wt loss, fevers, chills, night sweats, fatigue,   Eyes:  Good vision, no reported pain  ENT:  No sore throat, pain, runny nose, dysphagia  CV:  No pain, palpitations, hypo/hypertension  Resp:  No dyspnea, cough, tachypnea, wheezing  GI:  No pain, No nausea, No vomiting, No diarrhea, No constipation, No weight loss, No fever, No pruritis, No rectal bleeding, No tarry stools, No dysphagia,  :  No pain, bleeding, incontinence, nocturia  Muscle:  No pain, weakness  Neuro:  No weakness, tingling, memory problems  Psych:  No fatigue, insomnia, mood problems, depression  Endocrine:  No polyuria, polydipsia, cold/heat intolerance  Heme:  No petechiae, ecchymosis, easy bruisability  Skin:  No rash, tattoos, scars, edema      PHYSICAL EXAM:   Vital Signs:  Vital Signs Last 24 Hrs  T(C): 36.6 (18 Aug 2019 06:00), Max: 36.7 (17 Aug 2019 20:04)  T(F): 97.8 (18 Aug 2019 06:00), Max: 98 (17 Aug 2019 20:04)  HR: 71 (18 Aug 2019 06:00) (71 - 73)  BP: 109/68 (18 Aug 2019 06:00) (109/68 - 118/71)  BP(mean): --  RR: 17 (18 Aug 2019 06:00) (17 - 18)  SpO2: 98% (18 Aug 2019 06:00) (98% - 100%)  Daily     Daily I&O's Summary      GENERAL:  Appears stated age, well-groomed, well-nourished, no distress  HEENT:  NC/AT,  conjunctivae clear and pink, no thyromegaly, nodules, adenopathy, no JVD, sclera -anicteric  CHEST:  Full & symmetric excursion, no increased effort, breath sounds clear  HEART:  Regular rhythm, S1, S2, no murmur/rub/S3/S4, no abdominal bruit, no edema  ABDOMEN:  Soft, non-tender, non-distended, normoactive bowel sounds,  no masses ,no hepato-splenomegaly, no signs of chronic liver disease  EXTEREMITIES:  no cyanosis,clubbing or edema  SKIN:  No rash/erythema/ecchymoses/petechiae/wounds/abscess/warm/dry  NEURO:  Alert, oriented, no asterixis, no tremor, no encephalopathy      LABS:                        12.2   9.11  )-----------( 277      ( 18 Aug 2019 04:20 )             38.3     08-18    132<L>  |  96<L>  |  20  ----------------------------<  96  4.4   |  23  |  1.19    Ca    9.4      18 Aug 2019 04:20  Mg     2.1     08-18          amylase   lipase  RADIOLOGY & ADDITIONAL TESTS:

## 2019-08-19 ENCOUNTER — TRANSCRIPTION ENCOUNTER (OUTPATIENT)
Age: 54
End: 2019-08-19

## 2019-08-19 ENCOUNTER — RESULT REVIEW (OUTPATIENT)
Age: 54
End: 2019-08-19

## 2019-08-19 VITALS — SYSTOLIC BLOOD PRESSURE: 137 MMHG | HEART RATE: 75 BPM | DIASTOLIC BLOOD PRESSURE: 70 MMHG | TEMPERATURE: 99 F

## 2019-08-19 LAB
ANION GAP SERPL CALC-SCNC: 12 MMO/L — SIGNIFICANT CHANGE UP (ref 7–14)
BUN SERPL-MCNC: 15 MG/DL — SIGNIFICANT CHANGE UP (ref 7–23)
CALCIUM SERPL-MCNC: 9.7 MG/DL — SIGNIFICANT CHANGE UP (ref 8.4–10.5)
CHLORIDE SERPL-SCNC: 102 MMOL/L — SIGNIFICANT CHANGE UP (ref 98–107)
CO2 SERPL-SCNC: 23 MMOL/L — SIGNIFICANT CHANGE UP (ref 22–31)
CREAT SERPL-MCNC: 1.32 MG/DL — HIGH (ref 0.5–1.3)
GLUCOSE SERPL-MCNC: 93 MG/DL — SIGNIFICANT CHANGE UP (ref 70–99)
HCT VFR BLD CALC: 38.5 % — LOW (ref 39–50)
HGB BLD-MCNC: 12.6 G/DL — LOW (ref 13–17)
MAGNESIUM SERPL-MCNC: 2.2 MG/DL — SIGNIFICANT CHANGE UP (ref 1.6–2.6)
MCHC RBC-ENTMCNC: 29.2 PG — SIGNIFICANT CHANGE UP (ref 27–34)
MCHC RBC-ENTMCNC: 32.7 % — SIGNIFICANT CHANGE UP (ref 32–36)
MCV RBC AUTO: 89.1 FL — SIGNIFICANT CHANGE UP (ref 80–100)
NRBC # FLD: 0 K/UL — SIGNIFICANT CHANGE UP (ref 0–0)
PLATELET # BLD AUTO: 291 K/UL — SIGNIFICANT CHANGE UP (ref 150–400)
PMV BLD: 9.8 FL — SIGNIFICANT CHANGE UP (ref 7–13)
POTASSIUM SERPL-MCNC: 4.3 MMOL/L — SIGNIFICANT CHANGE UP (ref 3.5–5.3)
POTASSIUM SERPL-SCNC: 4.3 MMOL/L — SIGNIFICANT CHANGE UP (ref 3.5–5.3)
RBC # BLD: 4.32 M/UL — SIGNIFICANT CHANGE UP (ref 4.2–5.8)
RBC # FLD: 13 % — SIGNIFICANT CHANGE UP (ref 10.3–14.5)
SODIUM SERPL-SCNC: 137 MMOL/L — SIGNIFICANT CHANGE UP (ref 135–145)
WBC # BLD: 6.9 K/UL — SIGNIFICANT CHANGE UP (ref 3.8–10.5)
WBC # FLD AUTO: 6.9 K/UL — SIGNIFICANT CHANGE UP (ref 3.8–10.5)

## 2019-08-19 PROCEDURE — 88312 SPECIAL STAINS GROUP 1: CPT | Mod: 26

## 2019-08-19 PROCEDURE — 88305 TISSUE EXAM BY PATHOLOGIST: CPT | Mod: 26

## 2019-08-19 RX ORDER — FAMOTIDINE 10 MG/ML
1 INJECTION INTRAVENOUS
Qty: 0 | Refills: 0 | DISCHARGE

## 2019-08-19 RX ORDER — TAMSULOSIN HYDROCHLORIDE 0.4 MG/1
1 CAPSULE ORAL
Qty: 0 | Refills: 0 | DISCHARGE

## 2019-08-19 RX ORDER — ATORVASTATIN CALCIUM 80 MG/1
1 TABLET, FILM COATED ORAL
Qty: 0 | Refills: 0 | DISCHARGE

## 2019-08-19 RX ORDER — LOSARTAN POTASSIUM 100 MG/1
1 TABLET, FILM COATED ORAL
Qty: 0 | Refills: 0 | DISCHARGE

## 2019-08-19 RX ORDER — SODIUM CHLORIDE 9 MG/ML
60 INJECTION, SOLUTION INTRAVENOUS
Refills: 0 | Status: DISCONTINUED | OUTPATIENT
Start: 2019-08-19 | End: 2019-08-19

## 2019-08-19 RX ORDER — AMLODIPINE BESYLATE 2.5 MG/1
1 TABLET ORAL
Qty: 0 | Refills: 0 | DISCHARGE

## 2019-08-19 RX ORDER — TAMSULOSIN HYDROCHLORIDE 0.4 MG/1
1 CAPSULE ORAL
Qty: 30 | Refills: 0
Start: 2019-08-19 | End: 2019-09-17

## 2019-08-19 RX ORDER — PANTOPRAZOLE SODIUM 20 MG/1
1 TABLET, DELAYED RELEASE ORAL
Qty: 30 | Refills: 0
Start: 2019-08-19 | End: 2019-09-17

## 2019-08-19 RX ADMIN — SODIUM CHLORIDE 3 MILLILITER(S): 9 INJECTION INTRAMUSCULAR; INTRAVENOUS; SUBCUTANEOUS at 05:23

## 2019-08-19 RX ADMIN — SODIUM CHLORIDE 30 MILLILITER(S): 9 INJECTION, SOLUTION INTRAVENOUS at 11:23

## 2019-08-19 RX ADMIN — SODIUM CHLORIDE 3 MILLILITER(S): 9 INJECTION INTRAMUSCULAR; INTRAVENOUS; SUBCUTANEOUS at 14:34

## 2019-08-19 NOTE — PROGRESS NOTE ADULT - ASSESSMENT
55 yo M reporting a PMHx of HTN, HLD, T2DM (not on any medication), hypothyroidism (he is unsure the exact diagnosis for his thyroid), and chronic abdominal pain p/w an acute exacerbation of his abdominal pain and hyponatremia. Hyponatremia likely hypovolemic 2/2 poor po intake. Abdominal pain concerning for malignancy vs. PUD.        Problem/Plan - 1:  ·  Problem: Hyponatremia.  Plan:    Problem/Plan - 2:  ·  Problem: Abdominal pain.  Plan: Chronic abdominal pain previously responsive to anti gas medication and famotidine   -CT A/P  :  no acute pathology   -trial pantoprazole 40mg Qam.   apprecaite GI input  EGD: < from: Upper Endoscopy (08.19.19 @ 11:27) >    - Normal esophagus.                       - Z-line regular.                       - Gastritis. Biopsied.                       - Duodenitis.  Recommendation:      - Return pa    colonscopy : < from: Colonoscopy (08.19.19 @ 11:29) >   The examined portion of theileum was normal.                       - Internal hemorrhoids.                       - The examination was otherwise normal.                       - No specimens collected.      will dc on protonix twice daily   d/y u with GI   F/u Bx   avoid irritating food to stomch           Problem/Plan - 3:  ·  Problem: Hypokalemia.  Plan:  replete and monitor     Problem/Plan - 4:  ·  Problem: Hypertension.  Plan:   hold HCTZ 12.5mg qd  holding ARB and norvasc for now : BP on lower NL... can re- evalaute as o/p.     Problem/Plan - 5:  ·  Problem: Hypothyroidism.  Plan:  endo appreciated :  subclinical hypothyroidism with TSH 5.18, FT4 1.3, TT3 86. No sxs of hypothyroidism  TSH 3.7, FT4 1.19 , TT3 88. No subclinical or overt hypothyroidism.    TPO  neg  and thyroglobulin ab   f/u ( can be repeated as o/p )     Problem/Plan - 6:  Problem: Diabetes. Plan: Reports h/o T2DM, but takes no medications  -A1c  5.8      Problem/Plan - 7:  ·  Problem: HLD (hyperlipidemia).  Plan: -atorvastatin 20mg.     Problem/Plan - 8:  ·  Problem: BPH (benign prostatic hyperplasia).  Plan: tamsulosin 0.4mg qhs.     Problem/Plan - 9:  ·  Problem: low cortisol :   endo input apprecaited : Cortisol 7.1 at 5AM checked for hyponatremia in the setting of polydipsia of 3L/day and concurrent HCTZ use. Urine osmol of 97 and Lisa of 28. Suggestive of psychogenic polydipsia and diuretic use as cause of hyponatremia.   Glucose wnl, BP stable   Currently using opthalmic prednisolon 1% eye drops though it should not cause secondary AI.   - 8AM  total cortisol >10. no need for stim test. Very low suspicion of AI.

## 2019-08-19 NOTE — DISCHARGE NOTE PROVIDER - NSFOLLOWUPCLINICS_GEN_ALL_ED_FT
[facility] [place]  Internal Medicine  [address] [place] [address]  Phone: [phone]  Fax: [phone]  Follow Up Time:

## 2019-08-19 NOTE — DISCHARGE NOTE PROVIDER - INSTRUCTIONS
Low cholesterol diet. Salt restriction. 1800Kcal diabetic diet with carb restriction. Avoid complex carbohydrates such as bread, pasta, cereal, white rice, white potatoes, etc. Avoid concentrated sugar as found in desserts, candy, soda, juice, etc. Consume a diet based on lean protein (chicken, fish) and vegetables. Low cholesterol diet. 1800Kcal diabetic diet with carb restriction. Avoid complex carbohydrates such as bread, pasta, cereal, white rice, white potatoes, etc. Avoid concentrated sugar as found in desserts, candy, soda, juice, etc. Consume a diet based on lean protein (chicken, fish) and vegetables.

## 2019-08-19 NOTE — PROGRESS NOTE ADULT - SUBJECTIVE AND OBJECTIVE BOX
[facility]   MD JORDEN JACOBS DO ANGELA WONG, PA    TEL:  OFFICE: [phone]  DR GREER CELL: [phone]  DR. STUART CELL: [phone]  HAL FLORES CELL: [phone]        Patient is a 54y old  Male who presents with a chief complaint of CC: Abdominal pain and hyponatremia (18 Aug 2019 23:43)      Patient seen and examined at bedside. No chest pain/sob    VITALS:  T(F): 97.9 (08-19-19 @ 09:12), Max: 98.7 (08-18-19 @ 21:25)  HR: 75 (08-19-19 @ 09:12)  BP: 106/72 (08-19-19 @ 09:12)  RR: 16 (08-19-19 @ 09:12)  SpO2: 99% (08-19-19 @ 09:12)  Wt(kg): --    Height (cm): 162.6 (08-19 @ 09:12)  Weight (kg): 66.3 (08-19 @ 09:12)  BMI (kg/m2): 25.1 (08-19 @ 09:12)  BSA (m2): 1.71 (08-19 @ 09:12)    PHYSICAL EXAM:  Constitutional: NAD  Neck: No JVD  Respiratory: CTAB, no wheezes, rales or rhonchi  Cardiovascular: S1, S2, RRR  Gastrointestinal: BS+, soft, NT/ND  Extremities: No peripheral edema    Hospital Medications:   MEDICATIONS  (STANDING):  atorvastatin 20 milliGRAM(s) Oral at bedtime  lactated ringers. 60 milliLiter(s) (30 mL/Hr) IV Continuous <Continuous>  pantoprazole    Tablet 40 milliGRAM(s) Oral before breakfast  sodium chloride 0.9% lock flush 3 milliLiter(s) IV Push every 8 hours  sodium chloride 0.9%. 1000 milliLiter(s) (75 mL/Hr) IV Continuous <Continuous>  tamsulosin 0.4 milliGRAM(s) Oral at bedtime      LABS:  08-19    137  |  102  |  15  ----------------------------<  93  4.3   |  23  |  1.32<H>    Ca    9.7      19 Aug 2019 06:19  Mg     2.2     08-19      Creatinine Trend: 1.32 <--, 1.19 <--, 1.18 <--, 1.21 <--, 1.06 <--, 0.92 <--, 1.02 <--, 1.02 <--, 1.06 <--                                12.6   6.90  )-----------( 291      ( 19 Aug 2019 06:19 )             38.5     Urine Studies:    Urine Sodium 28      [08-14-19 @ 05:55]  Urine Osmolality 97      [08-14-19 @ 05:55]    HbA1c 5.8      [08-15-19 @ 05:19]  TSH 3.73      [08-16-19 @ 07:58]    HCV 0.07, Nonreactive Hepatitis C AB  S/CO Ratio                        Interpretation  < 1.00                                   Non-Reactive  1.00 - 4.99                         Weakly-Reactive  >= 5.00                                Reactive  Non-Reactive: Aperson with a non-reactive HCV antibody  result is considered uninfected.  No further action is  needed unless recent infection is suspected.  In these  cases, consider repeat testing later to detect  seroconversion..  Weakly-Reactive: HCV antibody test is abnormal, HCV RNA  Qualitative test will follow.  Reactive: HCV antibody test is abnormal, HCV RNA  Qualitative test will follow.  Note: HCV antibody testing is performed on the [employer] system.      [08-15-19 @ 05:19]      RADIOLOGY & ADDITIONAL STUDIES:

## 2019-08-19 NOTE — DISCHARGE NOTE PROVIDER - NSDCCPCAREPLAN_GEN_ALL_CORE_FT
PRINC[facility]AL DISCHARGE DIAGNOSIS  Diagnosis: Hyponatremia  Assessment and Plan of Treatment: Restrict water/fluid intake to 1 liter per day. Follow up with your nephrologist Dr. Nichols for further monitoring in 1-2 weeks. Please call to arrange appointment.      SECONDARY DISCHARGE DIAGNOSES  Diagnosis: Elevated serum creatinine  Assessment and Plan of Treatment: On discharge Cr 1.32. Hydrochlorothiazide and Losartan were discontinued. Follow up with your primary care physician for further monitoring in 1 week to recheck blood work to monitor kidney function. Please call to arrange appointment.    Diagnosis: Abdominal pain, chronic, generalized  Assessment and Plan of Treatment: Continue Protonix daily with breakfast. You underwent an endoscopy and colonoscopy. Follow up with your gastroenterologist Dr. Farmer for biopsy results within 2 weeks. Please call for an appointment.    Diagnosis: Prediabetes  Assessment and Plan of Treatment: Continue diet modification to prevent worsening blood sugar levels. Avoid complex carbohydrates such as bread, pasta, cereal, white rice, white potatoes, etc. Avoid concentrated sugar as found in desserts, candy, soda, juice, etc. Consume a diet based on lean protein (chicken, fish) and vegetables.   Follow up with your primary care physician for further monitoring to check HgbA1c/blood sugar levels.    Diagnosis: HLD (hyperlipidemia)  Assessment and Plan of Treatment: Continue Lipitor. Follow up with your primary care physician for further monitoring in 1-2 weeks. Please call to arrange appointment. Low cholesterol diet.    Diagnosis: Hypertension  Assessment and Plan of Treatment: Hydrochlorothiazide and Losartan were discontinued and your blood pressure was well controlled off these medications. Follow up with your primary care physician for further monitoring in 1-2 weeks. Please call to arrange appointment.    Diagnosis: Subclinical hypothyroidism  Assessment and Plan of Treatment: Follow up with your primary care physician for further monitoring of thyroid function.

## 2019-08-19 NOTE — PROGRESS NOTE ADULT - REASON FOR ADMISSION
CC: Abdominal pain and hyponatremia

## 2019-08-19 NOTE — PROGRESS NOTE ADULT - PROVIDER SPECIALTY LIST ADULT
Gastroenterology
Gastroenterology
Internal Medicine
Nephrology
Endocrinology

## 2019-08-19 NOTE — DISCHARGE NOTE PROVIDER - CARE PROVIDER_API CALL
Kelvin Farmer (DO)  Gastroenterology; Internal Medicine  [address] [place] [address]  Phone: [phone]  Fax: [phone]  Follow Up Time: Kelvin Farmer ()  Gastroenterology; Internal Medicine  [address] [place] [address]  Phone: [phone]  Fax: [phone]  Follow Up Time:     Kirk Nichols (MD)  Internal Medicine; Nephrology  [address], [address] [place] [address]  Phone: [phone]  Fax: [phone]  Follow Up Time:

## 2019-08-19 NOTE — PROGRESS NOTE ADULT - SUBJECTIVE AND OBJECTIVE BOX
Patient is a 54y old  Male who presents with a chief complaint of CC: Abdominal pain and hyponatremia (19 Aug 2019 16:40)                                                               INTERVAL HPI/OVERNIGHT EVENTS:    REVIEW OF SYSTEMS:     CONSTITUTIONAL: No weakness, fevers or chills  EYES/ENT: No visual changes , no ear ache   NECK: No pain or stiffness  RESPIRATORY: No cough, wheezing,  No shortness of breath  CARDIOVASCULAR: No chest pain or palpitations  GASTROINTESTINAL: No abdominal pain  . No nausea, vomiting, or hematemesis; No diarrhea or constipation. No melena or hematochezia.  GENITOURINARY: No dysuria, frequency or hematuria  NEUROLOGICAL: No numbness or weakness                                                                                                                                                                                                                                                                                Medications:  MEDICATIONS  (STANDING):  atorvastatin 20 milliGRAM(s) Oral at bedtime  lactated ringers. 60 milliLiter(s) (30 mL/Hr) IV Continuous <Continuous>  pantoprazole    Tablet 40 milliGRAM(s) Oral before breakfast  sodium chloride 0.9% lock flush 3 milliLiter(s) IV Push every 8 hours  sodium chloride 0.9%. 1000 milliLiter(s) (75 mL/Hr) IV Continuous <Continuous>  tamsulosin 0.4 milliGRAM(s) Oral at bedtime    MEDICATIONS  (PRN):  glucagon  Injectable 1 milliGRAM(s) IntraMuscular once PRN Glucose LESS THAN 70 milligrams/deciliter       Allergies    No Known Allergies    Intolerances      Vital Signs Last 24 Hrs  T(C): 36.7 (19 Aug 2019 14:16), Max: 37.1 (18 Aug 2019 21:25)  T(F): 98 (19 Aug 2019 14:16), Max: 98.7 (18 Aug 2019 21:25)  HR: 66 (19 Aug 2019 14:16) (66 - 75)  BP: 129/73 (19 Aug 2019 14:16) (106/72 - 129/73)  BP(mean): --  RR: 18 (19 Aug 2019 14:16) (16 - 18)  SpO2: 100% (19 Aug 2019 14:16) (98% - 100%)  CAPILLARY BLOOD GLUCOSE          Physical Exam:    Daily Height in cm: 162.6 (19 Aug 2019 09:12)      General:  Well appearing, NAD, not cachetic  HEENT:  Nonicteric, PERRLA  CV:  RRR, S1S2   Lungs:  CTA B/L, no wheezes, rales, rhonchi  Abdomen:  Soft, non-tender, no distended, positive BS  Extremities:  2+ pulses, no c/c, no edema  Skin:  Warm and dry, no rashes  :  No shell  Neuro:  AAOx3, non-focal, grossly intact                                                                                                                                                                                                                                                                                                LABS:                               12.6   6.90  )-----------( 291      ( 19 Aug 2019 06:19 )             38.5                      08-19    137  |  102  |  15  ----------------------------<  93  4.3   |  23  |  1.32<H>    Ca    9.7      19 Aug 2019 06:19  Mg     2.2     08-19

## 2019-08-19 NOTE — DISCHARGE NOTE PROVIDER - HOSPITAL COURSE
53 yo M reporting a PMHx of HTN, HLD, T2DM (not on any medication), hypothyroidism (he is unsure the exact diagnosis for his thyroid), hypokalemia, and chronic diffuse crampy abdominal pain 2-3 years now presenting with an acute exacerbation of his pain. The pain is diffuse and crampy, it waxes and wanes and causes early satiety as well as a loss of appetite. Patient believes he lost weight, but has not measured. Anti gas medications used to help, but no longer do. Denies N/V and hematemesis. No melena. Can have up to 3 BMs per day, no blood or mucous in the stool. No cp. Does endorse intermittent LE edema and NÚÑEZ. Former "chain smoker."        1. Hyponatremia: Renal following, Dr. Nichols, Acute and symptomatic    Urine osmo and urine na low likely polydipsia and decrease solid food intake, on HCTZ    -Pt was drinking 3L+ of water daily with poor oral intake in the last one week.    HCTZ on hold (would avoid in the future)    free water restriction <1L/day, patient educated using  ID #[id number]    No endocrine cause of hypoNa        2. Abdominal pain    hsTrop: 9, CXR - no focal consolidations. CT Abd/Pelvis No acute abnormality in the abdomen and pelvis.    GI following,  PPI qd, Simethicone q6h, Maalox prn     8/19: EGD: Normal esophagus. Z-line regular.Gastritis. Biopsied.Duodenitis.    8/19: Colonoscopy: the examined portion of theileum was normal. Internal hemorrhoids. The examination was otherwise normal. No specimens collected.        3. r/o Adrenal Insufficiency    - House Endocrine following Cortisol 7.1 at 5AM checked for hyponatremia in the setting of polydipsia of 3L/day and concurrent HCTZ use. Urine osmol of 97 and Lisa of 28. Suggestive of psychogenic polydipsia and diuretic use as cause of hyponatremia.  Glucose wnl, BP stable. Currently using opthalmic prednisolone 1% eye drops though it should not cause secondary AI. 8AM  total cortisol >10. no need for stim test. Very low suspicion of AI.         4. Subclinical Hypothyroidism    subclinical hypothyroidism with TSH 5.18, FT4 1.3, TT3 86. No sxs of hypothyroidism    TSH 3.7, FT4 1.19 , TT3 88. No subclinical or overt hypothyroidism.     - f/u TPO and thyroglobulin ab.         5. Prediabetes    - yearly hgb a1c check outpatient    - does not need FS and ISS.         Case discussed with Dr. Esposito, labs/vitals reviewed, Pt medically cleared for discharge to home with outpt follow up noted. 55 yo M reporting a PMHx of HTN, HLD, T2DM (not on any medication), hypothyroidism (he is unsure the exact diagnosis for his thyroid), hypokalemia, and chronic diffuse crampy abdominal pain 2-3 years now presenting with an acute exacerbation of his pain. The pain is diffuse and crampy, it waxes and wanes and causes early satiety as well as a loss of appetite. Patient believes he lost weight, but has not measured. Anti gas medications used to help, but no longer do. Denies N/V and hematemesis. No melena. Can have up to 3 BMs per day, no blood or mucous in the stool. No cp. Does endorse intermittent LE edema and NÚÑEZ. Former "chain smoker."        1. Hyponatremia: Renal following, Dr. Nichols, Acute and symptomatic    Urine osmo and urine na low likely polydipsia and decrease solid food intake, on HCTZ    -Pt was drinking 3L+ of water daily with poor oral intake in the last one week.    HCTZ on hold (would avoid in the future)    free water restriction <1L/day, patient educated using  ID #[id number]    No endocrine cause of hypoNa        2. Abdominal pain    hsTrop: 9, CXR - no focal consolidations. CT Abd/Pelvis No acute abnormality in the abdomen and pelvis.    GI following,  PPI qd, Simethicone q6h, Maalox prn     8/19: EGD: Normal esophagus. Z-line regular.Gastritis. Biopsied.Duodenitis.    8/19: Colonoscopy: the examined portion of theileum was normal. Internal hemorrhoids. The examination was otherwise normal. No specimens collected.        3. r/o Adrenal Insufficiency    - House Endocrine following Cortisol 7.1 at 5AM checked for hyponatremia in the setting of polydipsia of 3L/day and concurrent HCTZ use. Urine osmol of 97 and Lisa of 28. Suggestive of psychogenic polydipsia and diuretic use as cause of hyponatremia.  Glucose wnl, BP stable. Currently using opthalmic prednisolone 1% eye drops though it should not cause secondary AI. 8AM  total cortisol >10. no need for stim test. Very low suspicion of AI.         4. Subclinical Hypothyroidism    subclinical hypothyroidism with TSH 5.18, FT4 1.3, TT3 86. No sxs of hypothyroidism    TSH 3.7, FT4 1.19 , TT3 88. No subclinical or overt hypothyroidism.     - f/u TPO and thyroglobulin ab.         5. Prediabetes    - yearly hgb a1c check outpatient    - does not need FS and ISS.         Case discussed with Dr. Esposito, labs/vitals reviewed, Pt medically cleared for discharge to home with outpt follow up noted. Medications reviewed with Pt and son on phone at bedside, Pt did not require Rx of home medications and new Rx for PPI was sent to outpt pharmacy.

## 2019-08-19 NOTE — PROGRESS NOTE ADULT - ASSESSMENT
55 yo M  PMHx of HTN, HLD, hypothyroidism, hx of hyponatremia and hypokalemia present with abd discomfort, found to have severe hyponatremia    Hyponatremia  Acute and symptomatic\.  urine osmo and urine na low likely polydipsia and decrease solid food intake, on HCTZ  Pt was drinking 3L+ of water daily with poor oral intake in the last one week.  Na improving  HCTZ on hold (would avoid in the future)  free water restriction <1L/day, patient educated using  ID #[id number]  monitor bmp  elevated tSH  repeat cortisol 10.4, endo input appreciated   monitor na    Elevated scr   ? etiology   possible prerenal sec to strict fluid restriction  r/o obstruction, bladder scan x 1  monitor    Hypokalemia  likely sec to poor po intake  supplemented  monitor    HTN  bp controlled off bp meds   hold bp meds (losartan  50, norvasc 5)  monitor    LE pain  better

## 2019-08-19 NOTE — DISCHARGE NOTE PROVIDER - PROVIDER TOKENS
PROVIDER:[TOKEN:[[id number]]] PROVIDER:[TOKEN:[[id number]]],PROVIDER:[TOKEN:[[id number]]]

## 2019-08-21 LAB — SURGICAL PATHOLOGY STUDY: SIGNIFICANT CHANGE UP

## 2019-08-28 PROBLEM — E78.5 HYPERLIPIDEMIA, UNSPECIFIED: Chronic | Status: ACTIVE | Noted: 2019-08-14

## 2019-08-28 PROBLEM — I10 ESSENTIAL (PRIMARY) HYPERTENSION: Chronic | Status: ACTIVE | Noted: 2019-08-14

## 2019-08-28 PROBLEM — E11.9 TYPE 2 DIABETES MELLITUS WITHOUT COMPLICATIONS: Chronic | Status: ACTIVE | Noted: 2019-08-14

## 2019-08-28 PROBLEM — E87.6 HYPOKALEMIA: Chronic | Status: ACTIVE | Noted: 2019-08-14

## 2019-09-10 ENCOUNTER — LABORATORY RESULT (OUTPATIENT)
Age: 54
End: 2019-09-10

## 2019-09-10 ENCOUNTER — OUTPATIENT (OUTPATIENT)
Dept: OUTPATIENT SERVICES | Facility: HOSPITAL | Age: 54
LOS: 1 days | End: 2019-09-10

## 2019-09-10 ENCOUNTER — APPOINTMENT (OUTPATIENT)
Dept: INTERNAL MEDICINE | Facility: CLINIC | Age: 54
End: 2019-09-10
Payer: MEDICAID

## 2019-09-10 VITALS
BODY MASS INDEX: 22.18 KG/M2 | HEART RATE: 62 BPM | SYSTOLIC BLOOD PRESSURE: 130 MMHG | OXYGEN SATURATION: 99 % | WEIGHT: 138 LBS | HEIGHT: 66.14 IN | DIASTOLIC BLOOD PRESSURE: 80 MMHG

## 2019-09-10 DIAGNOSIS — E78.5 HYPERLIPIDEMIA, UNSPECIFIED: ICD-10-CM

## 2019-09-10 DIAGNOSIS — Z83.3 FAMILY HISTORY OF DIABETES MELLITUS: ICD-10-CM

## 2019-09-10 DIAGNOSIS — I73.9 PERIPHERAL VASCULAR DISEASE, UNSPECIFIED: ICD-10-CM

## 2019-09-10 DIAGNOSIS — Z82.49 FAMILY HISTORY OF ISCHEMIC HEART DISEASE AND OTHER DISEASES OF THE CIRCULATORY SYSTEM: ICD-10-CM

## 2019-09-10 DIAGNOSIS — Z87.891 PERSONAL HISTORY OF NICOTINE DEPENDENCE: ICD-10-CM

## 2019-09-10 DIAGNOSIS — Z82.5 FAMILY HISTORY OF ASTHMA AND OTHER CHRONIC LOWER RESPIRATORY DISEASES: ICD-10-CM

## 2019-09-10 DIAGNOSIS — Z56.0 UNEMPLOYMENT, UNSPECIFIED: ICD-10-CM

## 2019-09-10 DIAGNOSIS — Z78.9 OTHER SPECIFIED HEALTH STATUS: ICD-10-CM

## 2019-09-10 LAB
ALBUMIN SERPL ELPH-MCNC: 5.1 G/DL — HIGH (ref 3.3–5)
ALP SERPL-CCNC: 113 U/L — SIGNIFICANT CHANGE UP (ref 40–120)
ALT FLD-CCNC: 27 U/L — SIGNIFICANT CHANGE UP (ref 4–41)
ANION GAP SERPL CALC-SCNC: 13 MMO/L — SIGNIFICANT CHANGE UP (ref 7–14)
AST SERPL-CCNC: 24 U/L — SIGNIFICANT CHANGE UP (ref 4–40)
BILIRUB SERPL-MCNC: 0.5 MG/DL — SIGNIFICANT CHANGE UP (ref 0.2–1.2)
BUN SERPL-MCNC: 19 MG/DL — SIGNIFICANT CHANGE UP (ref 7–23)
CALCIUM SERPL-MCNC: 10.4 MG/DL — SIGNIFICANT CHANGE UP (ref 8.4–10.5)
CHLORIDE SERPL-SCNC: 99 MMOL/L — SIGNIFICANT CHANGE UP (ref 98–107)
CHOLEST SERPL-MCNC: 144 MG/DL — SIGNIFICANT CHANGE UP (ref 120–199)
CO2 SERPL-SCNC: 27 MMOL/L — SIGNIFICANT CHANGE UP (ref 22–31)
CREAT SERPL-MCNC: 1.28 MG/DL — SIGNIFICANT CHANGE UP (ref 0.5–1.3)
GLUCOSE SERPL-MCNC: 94 MG/DL — SIGNIFICANT CHANGE UP (ref 70–99)
HBA1C BLD-MCNC: 5.9 % — HIGH (ref 4–5.6)
HDLC SERPL-MCNC: 51 MG/DL — SIGNIFICANT CHANGE UP (ref 35–55)
LIPID PNL WITH DIRECT LDL SERPL: 80 MG/DL — SIGNIFICANT CHANGE UP
POTASSIUM SERPL-MCNC: 4.5 MMOL/L — SIGNIFICANT CHANGE UP (ref 3.5–5.3)
POTASSIUM SERPL-SCNC: 4.5 MMOL/L — SIGNIFICANT CHANGE UP (ref 3.5–5.3)
PROT SERPL-MCNC: 8.7 G/DL — HIGH (ref 6–8.3)
SODIUM SERPL-SCNC: 139 MMOL/L — SIGNIFICANT CHANGE UP (ref 135–145)
T4 FREE SERPL-MCNC: 1.1 NG/DL — SIGNIFICANT CHANGE UP (ref 0.9–1.8)
TRIGL SERPL-MCNC: 165 MG/DL — HIGH (ref 10–149)
TSH SERPL-MCNC: 3.81 UIU/ML — SIGNIFICANT CHANGE UP (ref 0.27–4.2)

## 2019-09-10 PROCEDURE — 99204 OFFICE O/P NEW MOD 45 MIN: CPT

## 2019-09-10 SDOH — ECONOMIC STABILITY - INCOME SECURITY: UNEMPLOYMENT, UNSPECIFIED: Z56.0

## 2019-09-11 DIAGNOSIS — E78.5 HYPERLIPIDEMIA, UNSPECIFIED: ICD-10-CM

## 2019-09-11 DIAGNOSIS — E11.9 TYPE 2 DIABETES MELLITUS WITHOUT COMPLICATIONS: ICD-10-CM

## 2019-09-11 DIAGNOSIS — E87.1 HYPO-OSMOLALITY AND HYPONATREMIA: ICD-10-CM

## 2019-09-11 DIAGNOSIS — E03.9 HYPOTHYROIDISM, UNSPECIFIED: ICD-10-CM

## 2019-09-11 DIAGNOSIS — R10.84 GENERALIZED ABDOMINAL PAIN: ICD-10-CM

## 2019-09-11 DIAGNOSIS — I73.9 PERIPHERAL VASCULAR DISEASE, UNSPECIFIED: ICD-10-CM

## 2019-09-11 DIAGNOSIS — I10 ESSENTIAL (PRIMARY) HYPERTENSION: ICD-10-CM

## 2019-09-11 LAB
CREAT UR-MCNC: 85 MG/DL — SIGNIFICANT CHANGE UP
MICROALBUMIN UR-MCNC: 1.6 MG/DL — SIGNIFICANT CHANGE UP
MICROALBUMIN/CREAT UR-RTO: 19 MG/G — SIGNIFICANT CHANGE UP (ref 0–30)

## 2019-09-13 NOTE — PHYSICAL EXAM
[No Acute Distress] : no acute distress [Well Nourished] : well nourished [Well-Appearing] : well-appearing [Well Developed] : well developed [Normal Sclera/Conjunctiva] : normal sclera/conjunctiva [Normal Outer Ear/Nose] : the outer ears and nose were normal in appearance [EOMI] : extraocular movements intact [Normal Oropharynx] : the oropharynx was normal [No JVD] : no jugular venous distention [No Lymphadenopathy] : no lymphadenopathy [Thyroid Normal, No Nodules] : the thyroid was normal and there were no nodules present [No Respiratory Distress] : no respiratory distress  [No Accessory Muscle Use] : no accessory muscle use [Clear to Auscultation] : lungs were clear to auscultation bilaterally [Normal Rate] : normal rate  [Regular Rhythm] : with a regular rhythm [Normal S1, S2] : normal S1 and S2 [No Murmur] : no murmur heard [No Abdominal Bruit] : a ~M bruit was not heard ~T in the abdomen [No Varicosities] : no varicosities [No Edema] : there was no peripheral edema [No Extremity Clubbing/Cyanosis] : no extremity clubbing/cyanosis [Soft] : abdomen soft [No Masses] : no abdominal mass palpated [Non-distended] : non-distended [No HSM] : no HSM [Normal Bowel Sounds] : normal bowel sounds [No CVA Tenderness] : no CVA  tenderness [No Spinal Tenderness] : no spinal tenderness [No Rash] : no rash [Normal Gait] : normal gait [Coordination Grossly Intact] : coordination grossly intact [Speech Grossly Normal] : speech grossly normal [Normal Affect] : the affect was normal [Normal Mood] : the mood was normal [de-identified] : b/l PT pulses 1+ [de-identified] : mild RLQ TTP [de-identified] : motor strength b/l UE and LE 5/5

## 2019-09-13 NOTE — ADDENDUM
[FreeTextEntry1] : DANGELO García, 9/13/19: labs reviewed-\par Repeat CMP with normal sodium. Elevated TP likely driven by slightly elevated albumin. Cr still 1.28, prior b/l 1.1. A1c 5.9, preDM level, likely does not have T2DM. UAC 19. TSH and FT4 normal. Lipid panel with slight elev TG, good HDL level-encourage lifestyle changes. \par

## 2019-09-13 NOTE — HISTORY OF PRESENT ILLNESS
[Post-hospitalization from ___ Hospital] : Post-hospitalization from [unfilled] Hospital [FreeTextEntry2] : [facility]  # [id number]\par 54 year old man with history of hypertension, hyperlipidemia, possible type 2 diabetes, hypothyroidism, with chronic abdominal pain who was admitted with hyponatremia and acute exacerbation of abdominal pain to [facility] from 8/14-8/19/19 and presents today for post-hospital discharge follow-up. During hospitalization, abdominal pain was investigated with CT AP which did not show acute pathology, EGD which showed gastritis and duodenitis (biopsy negative H pylori, showed inactive chronic gastritis), and colonoscopy showed internal hemorrhoids. His initial Na was 120, after evaluation was thought to be 2/2 HCTZ use and polydipsia, as patient reported having low po intake and lots of water intake. Na was 137 on discharge, but creatinine had come up from 1.1 to 1.3. Evaluation for hypothyroidism and adrenal insufficiency were negative. Was discharged with plan for GI and renal follow-up. \par \par Today, he reports that since his hospitalization he has been feeling better; he does not have early satiety or poor appetite. He does still have constipation, reports 1 bowel movement daily that is hard, but without any bleeding. He denies any ana abdominal pain but reports that the feeling is one of heaviness that is relieved with BM. Denies weight loss, fevers, back pain. Previously was controlling constipation with lots of fiber supplementation with copious amounts of water intake; he has since limited his water intake to 1-1.25L daily since discharge. Denies N/V/D, has mild urinary hesitancy that improved since starting tamsulosin some months ago. \par \par Ophthalmologist: [facility], Maik Wiggins [phone]

## 2019-09-13 NOTE — REVIEW OF SYSTEMS
[Fatigue] : fatigue [Lower Ext Edema] : lower extremity edema [Claudication] : leg claudication [Constipation] : constipation [Hesitancy] : hesitancy [Fever] : no fever [Recent Change In Weight] : ~T no recent weight change [Discharge] : no discharge [Vision Problems] : no vision problems [Hearing Loss] : no hearing loss [Chest Pain] : no chest pain [Palpitations] : no palpitations [Orthopena] : no orthopnea [Paroysmal Nocturnal Dyspnea] : no paroysmal nocturnal dyspnea [Shortness Of Breath] : no shortness of breath [Wheezing] : no wheezing [Cough] : no cough [Dyspnea on Exertion] : not dyspnea on exertion [Abdominal Pain] : no abdominal pain [Nausea] : no nausea [Diarrhea] : no diarrhea [Vomiting] : no vomiting [Heartburn] : no heartburn [Melena] : no melena [Dysuria] : no dysuria [Incontinence] : no incontinence [Nocturia] : no nocturia [Hematuria] : no hematuria [Frequency] : no frequency [Muscle Pain] : no muscle pain [Back Pain] : no back pain [Headache] : no headache [Dizziness] : no dizziness [Confusion] : no confusion [Fainting] : no fainting [Easy Bleeding] : no easy bleeding [FreeTextEntry2] : generalized weakness [FreeTextEntry3] : history of cataract surgery b/l and improved vision since [FreeTextEntry5] : legs swell with prolonged standing [FreeTextEntry9] : g [de-identified] : no h/o seizures

## 2019-09-24 ENCOUNTER — OUTPATIENT (OUTPATIENT)
Dept: OUTPATIENT SERVICES | Facility: HOSPITAL | Age: 54
LOS: 1 days | End: 2019-09-24

## 2019-09-24 ENCOUNTER — APPOINTMENT (OUTPATIENT)
Dept: INTERNAL MEDICINE | Facility: CLINIC | Age: 54
End: 2019-09-24
Payer: MEDICAID

## 2019-09-24 VITALS
SYSTOLIC BLOOD PRESSURE: 131 MMHG | DIASTOLIC BLOOD PRESSURE: 80 MMHG | WEIGHT: 138 LBS | HEIGHT: 66 IN | BODY MASS INDEX: 22.18 KG/M2 | HEART RATE: 65 BPM

## 2019-09-24 DIAGNOSIS — R10.84 GENERALIZED ABDOMINAL PAIN: ICD-10-CM

## 2019-09-24 DIAGNOSIS — R73.03 PREDIABETES.: ICD-10-CM

## 2019-09-24 DIAGNOSIS — E03.9 HYPOTHYROIDISM, UNSPECIFIED: ICD-10-CM

## 2019-09-24 DIAGNOSIS — E87.1 HYPO-OSMOLALITY AND HYPONATREMIA: ICD-10-CM

## 2019-09-24 DIAGNOSIS — E11.9 TYPE 2 DIABETES MELLITUS W/OUT COMPLICATIONS: ICD-10-CM

## 2019-09-24 DIAGNOSIS — M77.11 LATERAL EPICONDYLITIS, RIGHT ELBOW: ICD-10-CM

## 2019-09-24 DIAGNOSIS — I10 ESSENTIAL (PRIMARY) HYPERTENSION: ICD-10-CM

## 2019-09-24 DIAGNOSIS — Z00.00 ENCOUNTER FOR GENERAL ADULT MEDICAL EXAMINATION W/OUT ABNORMAL FINDINGS: ICD-10-CM

## 2019-09-24 DIAGNOSIS — M77.02 MEDIAL EPICONDYLITIS, LEFT ELBOW: ICD-10-CM

## 2019-09-24 LAB — GLUCOSE BLDC GLUCOMTR-MCNC: 98

## 2019-09-24 PROCEDURE — 99214 OFFICE O/P EST MOD 30 MIN: CPT

## 2019-09-24 RX ORDER — MULTIVITAMIN/IRON/FOLIC ACID 18MG-0.4MG
600-400 TABLET ORAL DAILY
Qty: 90 | Refills: 1 | Status: ACTIVE | COMMUNITY
Start: 2019-09-24 | End: 1900-01-01

## 2019-09-24 RX ORDER — CALCIUM CARBONATE/VITAMIN D3 600 MG-10
600-400 TABLET ORAL
Refills: 0 | Status: DISCONTINUED | COMMUNITY
End: 2019-09-24

## 2019-09-24 RX ORDER — AMLODIPINE BESYLATE 5 MG/1
5 TABLET ORAL DAILY
Qty: 30 | Refills: 5 | Status: ACTIVE | COMMUNITY

## 2019-09-24 RX ORDER — TAMSULOSIN HYDROCHLORIDE 0.4 MG/1
0.4 CAPSULE ORAL
Qty: 90 | Refills: 1 | Status: ACTIVE | COMMUNITY
Start: 1900-01-01 | End: 1900-01-01

## 2019-09-24 RX ORDER — FOLIC ACID 1 MG/1
1 TABLET ORAL
Qty: 90 | Refills: 1 | Status: ACTIVE | COMMUNITY
Start: 1900-01-01 | End: 1900-01-01

## 2019-09-24 RX ORDER — PANTOPRAZOLE 40 MG/1
40 TABLET, DELAYED RELEASE ORAL
Refills: 0 | Status: DISCONTINUED | COMMUNITY
End: 2019-09-24

## 2019-09-24 RX ORDER — ATORVASTATIN CALCIUM 20 MG/1
20 TABLET, FILM COATED ORAL
Qty: 90 | Refills: 1 | Status: ACTIVE | COMMUNITY
Start: 1900-01-01 | End: 1900-01-01

## 2019-09-24 RX ORDER — CYANOCOBALAMIN (VITAMIN B-12) 1000 MCG
1000 TABLET, EXTENDED RELEASE ORAL
Qty: 90 | Refills: 1 | Status: ACTIVE | COMMUNITY
Start: 1900-01-01 | End: 1900-01-01

## 2019-10-01 DIAGNOSIS — M77.11 LATERAL EPICONDYLITIS, RIGHT ELBOW: ICD-10-CM

## 2019-10-01 DIAGNOSIS — R73.03 PREDIABETES: ICD-10-CM

## 2019-10-01 DIAGNOSIS — E87.1 HYPO-OSMOLALITY AND HYPONATREMIA: ICD-10-CM

## 2019-10-01 DIAGNOSIS — R10.84 GENERALIZED ABDOMINAL PAIN: ICD-10-CM

## 2019-10-01 DIAGNOSIS — I10 ESSENTIAL (PRIMARY) HYPERTENSION: ICD-10-CM

## 2019-10-01 DIAGNOSIS — M77.02 MEDIAL EPICONDYLITIS, LEFT ELBOW: ICD-10-CM

## 2019-10-01 NOTE — REVIEW OF SYSTEMS
[Fever] : no fever [Fatigue] : no fatigue [Night Sweats] : no night sweats [Pain] : no pain [Vision Problems] : no vision problems [Nasal Discharge] : no nasal discharge [Hearing Loss] : no hearing loss [Chest Pain] : no chest pain [Lower Ext Edema] : no lower extremity edema [Palpitations] : no palpitations [Shortness Of Breath] : no shortness of breath [Dyspnea on Exertion] : not dyspnea on exertion [Nausea] : no nausea [Vomiting] : no vomiting [Diarrhea] : no diarrhea [Melena] : no melena [Joint Pain] : joint pain [Joint Stiffness] : no joint stiffness [Muscle Weakness] : no muscle weakness [Back Pain] : no back pain [Joint Swelling] : no joint swelling [Skin Rash] : no skin rash [Headache] : no headache [Dizziness] : dizziness [Memory Loss] : no memory loss [Negative] : Genitourinary [FreeTextEntry7] : see HPI [FreeTextEntry9] : see HPI [de-identified] : see HPI

## 2019-10-01 NOTE — PHYSICAL EXAM
[No Acute Distress] : no acute distress [Well Nourished] : well nourished [Well Developed] : well developed [Well-Appearing] : well-appearing [Normal Sclera/Conjunctiva] : normal sclera/conjunctiva [EOMI] : extraocular movements intact [Normal Outer Ear/Nose] : the outer ears and nose were normal in appearance [Normal Oropharynx] : the oropharynx was normal [No JVD] : no jugular venous distention [Thyroid Normal, No Nodules] : the thyroid was normal and there were no nodules present [No Respiratory Distress] : no respiratory distress  [No Accessory Muscle Use] : no accessory muscle use [Clear to Auscultation] : lungs were clear to auscultation bilaterally [Normal Rate] : normal rate  [Regular Rhythm] : with a regular rhythm [Normal S1, S2] : normal S1 and S2 [No Edema] : there was no peripheral edema [Soft] : abdomen soft [Non Tender] : non-tender [Non-distended] : non-distended [Normal Bowel Sounds] : normal bowel sounds [No Joint Swelling] : no joint swelling [Grossly Normal Strength/Tone] : grossly normal strength/tone [No Rash] : no rash [Coordination Grossly Intact] : coordination grossly intact [No Focal Deficits] : no focal deficits [Normal Gait] : normal gait [Normal Affect] : the affect was normal [Normal Insight/Judgement] : insight and judgment were intact [de-identified] : FROM, no TTP [de-identified] : +TTP lateral epicondyle on right and medial epicondyle on left; 5/5 UE strength b/l and 5/5 hand  b/l

## 2019-10-01 NOTE — HISTORY OF PRESENT ILLNESS
[[facility] [employer] ] : provided by [employer]   [FreeTextEntry3] : BAKARI Steel  #[id number] and [id number] [TWNoteComboBox1] : Damián [de-identified] : 54 year old male here for follow up after recent visit for hosp dc; repeat labs showed normal sodium and creatinine, reports that he tries to not drink too much water as per his hospital dc instructions, a few days ago felt dizzy when he stood up, but has since resolved. Was seen by GI in the interim for abd pain and PPI was stopped, was given miralax 17g to take daily, reports that he had a soft stool yesterday evening and no more abdominal pain and no constipation with miralax. 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 No pain at rest when not carrying anything. No swelling, no redness, no prior episodes, has not taken anything for it. \par A1c was 5.9%, patient notes that he does not drink soda or juice, does eat rice/bread, also eats a lot of veg/fish. \par Repeat TFTs were normal\par Still pending CORKY/PVR and LE duplex\par Reviewed lipids, ASCVD 10 yr risk score 4.59% on atorva 20mg already

## 2019-12-30 ENCOUNTER — APPOINTMENT (OUTPATIENT)
Dept: INTERNAL MEDICINE | Facility: CLINIC | Age: 54
End: 2019-12-30

## 2020-05-27 ENCOUNTER — APPOINTMENT (OUTPATIENT)
Dept: INTERNAL MEDICINE | Facility: CLINIC | Age: 55
End: 2020-05-27

## 2020-06-04 NOTE — PATIENT PROFILE ADULT - LIVES WITH
CH[facility] Clinic telephone visit, pt still not taking januvia, asa, atorvastatin. BP from  yesterday after taking meds. CPM. Advised schedule limited echo/Dr Canseco  spouse

## 2020-07-22 ENCOUNTER — APPOINTMENT (OUTPATIENT)
Dept: INTERNAL MEDICINE | Facility: CLINIC | Age: 55
End: 2020-07-22

## 2021-10-06 PROBLEM — I10 ESSENTIAL HYPERTENSION: Status: ACTIVE | Noted: 2019-09-10

## 2022-02-11 NOTE — PATIENT PROFILE ADULT - MEDICATIONS/VISITS
Patient was seen and evaluated at bedside. Patient febrile again yesterday to 102F. Patient continues on heparin drip. VS reviewed.    Vital Signs Last 24 Hrs  T(C): 37.9 (11 Feb 2022 07:43), Max: 38.9 (10 Feb 2022 23:45)  T(F): 100.3 (11 Feb 2022 07:43), Max: 102 (10 Feb 2022 23:45)  HR: 106 (11 Feb 2022 07:43) (106 - 106)  BP: 145/86 (10 Feb 2022 20:03) (145/86 - 145/86)  BP(mean): 81 (11 Feb 2022 07:43) (81 - 81)  RR: 18 (11 Feb 2022 07:43) (18 - 18)  SpO2: 94% (11 Feb 2022 07:43) (93% - 94%)    Physical Exam:  General: AAOx3, in NAD  HEENT: NC/AT, EOMI, poor dentition  Cardio: S1S2, tachycardic  Pulm: equal air entry b/l, non labored  Chest: dressing over right chest c/d/i  Abdomen: soft, NT/ND  Extremities: multiple excoriations over all extremities    Labs:                        6.7    5.19  )-----------( 230      ( 11 Feb 2022 08:08 )             21.0   02-11    129<L>  |  97  |  32<H>  ----------------------------<  87  3.4<L>   |  19<L>  |  6.13<H>    Ca    8.0<L>      11 Feb 2022 08:08    TPro  6.1  /  Alb  1.8<L>  /  TBili  0.3  /  DBili  x   /  AST  7<L>  /  ALT  8<L>  /  AlkPhos  76  02-11   no
